# Patient Record
Sex: FEMALE | Race: WHITE | NOT HISPANIC OR LATINO | URBAN - METROPOLITAN AREA
[De-identification: names, ages, dates, MRNs, and addresses within clinical notes are randomized per-mention and may not be internally consistent; named-entity substitution may affect disease eponyms.]

---

## 2020-08-27 ENCOUNTER — OFFICE VISIT (OUTPATIENT)
Dept: URBAN - METROPOLITAN AREA TELEHEALTH 2 | Facility: TELEHEALTH | Age: 70
End: 2020-08-27
Payer: MEDICARE

## 2020-08-27 ENCOUNTER — LAB OUTSIDE AN ENCOUNTER (OUTPATIENT)
Dept: URBAN - METROPOLITAN AREA TELEHEALTH 2 | Facility: TELEHEALTH | Age: 70
End: 2020-08-27

## 2020-08-27 DIAGNOSIS — R10.12 LEFT UPPER QUADRANT ABDOMINAL PAIN: ICD-10-CM

## 2020-08-27 DIAGNOSIS — Z86.010 PERSONAL HISTORY OF COLONIC POLYPS: ICD-10-CM

## 2020-08-27 DIAGNOSIS — K59.01 CONSTIPATION BY DELAYED COLONIC TRANSIT: ICD-10-CM

## 2020-08-27 PROCEDURE — G8427 DOCREV CUR MEDS BY ELIG CLIN: HCPCS | Performed by: NURSE PRACTITIONER

## 2020-08-27 PROCEDURE — G8417 CALC BMI ABV UP PARAM F/U: HCPCS | Performed by: NURSE PRACTITIONER

## 2020-08-27 PROCEDURE — G9903 PT SCRN TBCO ID AS NON USER: HCPCS | Performed by: NURSE PRACTITIONER

## 2020-08-27 PROCEDURE — 99213 OFFICE O/P EST LOW 20 MIN: CPT | Performed by: NURSE PRACTITIONER

## 2020-08-27 PROCEDURE — 1036F TOBACCO NON-USER: CPT | Performed by: NURSE PRACTITIONER

## 2020-08-27 PROCEDURE — 3017F COLORECTAL CA SCREEN DOC REV: CPT | Performed by: NURSE PRACTITIONER

## 2020-08-27 RX ORDER — TRAMADOL HYDROCHLORIDE 50 MG/1
TABLET ORAL
Qty: 0 | Refills: 0 | Status: ACTIVE | COMMUNITY
Start: 1900-01-01

## 2020-08-27 RX ORDER — MELOXICAM 15 MG/1
TAKE 0.5 TABLET BY ORAL ROUTE DAILY AS NEEDED TABLET ORAL 1
Qty: 0 | Refills: 0 | Status: ACTIVE | COMMUNITY
Start: 1900-01-01

## 2020-08-27 RX ORDER — ASPIRIN 325 MG/1
TABLET ORAL
Qty: 0 | Refills: 0 | Status: ACTIVE | COMMUNITY
Start: 1900-01-01

## 2020-08-27 RX ORDER — LISINOPRIL 20 MG/1
TAKE 1 TABLET (20 MG) BY ORAL ROUTE ONCE DAILY TABLET ORAL 1
Qty: 0 | Refills: 0 | Status: ACTIVE | COMMUNITY
Start: 1900-01-01

## 2020-08-27 RX ORDER — CHLORTHALIDONE 25 MG/1
TAKE 1 TABLET (25 MG) BY ORAL ROUTE ONCE DAILY TABLET ORAL 1
Qty: 0 | Refills: 0 | Status: ACTIVE | COMMUNITY
Start: 1900-01-01

## 2020-08-27 RX ORDER — HYOSCYAMINE SULFATE 0.12 MG/1
PLACE 1 TABLET UNDER TONGUE BY TRANSLINGUAL ROUTE EVERY 4 TO 6 HOURS AS NEEDED  SPASM/ABDOMINAL PAIN TABLET, ORALLY DISINTEGRATING ORAL
Qty: 60 | Refills: 3 | Status: ACTIVE | COMMUNITY
Start: 2019-10-22

## 2020-08-27 RX ORDER — ESOMEPRAZOLE MAGNESIUM 20 MG/1
TAKE 1 CAPSULE (20 MG) BY ORAL ROUTE ONCE DAILY AT LEAST 1 HOUR BEFORE A MEAL SWALLOWING WHOLE. DO NOT CRUSH OR CHEW GRANULES CAPSULE, DELAYED RELEASE ORAL 1
Qty: 0 | Refills: 0 | Status: ACTIVE | COMMUNITY
Start: 1900-01-01

## 2020-08-27 RX ORDER — TRIAMTERENE AND HYDROCHLOROTHIAZIDE 37.5; 25 MG/1; MG/1
TAKE 1 TABLET BY ORAL ROUTE ONCE DAILY TABLET ORAL 1
Qty: 0 | Refills: 0 | Status: ACTIVE | COMMUNITY
Start: 1900-01-01

## 2020-08-27 RX ORDER — AMITRIPTYLINE HYDROCHLORIDE 25 MG/1
TAKE 1 TABLET (25 MG) BY ORAL ROUTE ONCE DAILY AT BEDTIME FOR 90 DAYS TABLET, FILM COATED ORAL 1
Qty: 90 | Refills: 3 | Status: ACTIVE | COMMUNITY
Start: 2017-10-13

## 2020-08-27 NOTE — HPI-OTHER HISTORIES
Mrs. Carole Garcia is a 69-year-old female who presents via telephone/telehealth visit today for abdominal pain.  In the past she has followed with Dr. Polanco and Jazmyn.  She has history of diverticulitis.  Until 3 weeks ago, she was in her usual state of health and feeling well.  About that time she started to notice some abdominal bloating and distention with change in her bowel habits including smaller stools and less frequent stools.  She has been on Colace at bedtime for quite a while which has kept her regular.  She also takes Metamucil.  About a week ago she began to have various twinges of abdominal pain in the right lower quadrant and left upper quadrant.  She is concerned she may be developing diverticulitis.  No fever or chills.  The right lower quadrant pain resolved however she continues with the left mid to upper discomfort.  No tenderness when she pushes on this area. She has a CTA abdomen scheduled for Monday with her vascular surgeon.  She last had colonoscopy 1/2020 with a hyperplastic polyp in the ascending colon and diverticulosis of the sigmoid and descending colon-recommended to repeat in 5 years for personal history of polyps.  TG

## 2020-08-28 ENCOUNTER — TELEPHONE ENCOUNTER (OUTPATIENT)
Dept: URBAN - METROPOLITAN AREA CLINIC 92 | Facility: CLINIC | Age: 70
End: 2020-08-28

## 2020-08-28 RX ORDER — METRONIDAZOLE 500 MG/1
1 TABLET TABLET, FILM COATED ORAL THREE TIMES A DAY
Qty: 30 TABLETS | Refills: 0 | OUTPATIENT
Start: 2020-08-28 | End: 2020-09-07

## 2020-08-28 RX ORDER — LISINOPRIL 20 MG/1
TAKE 1 TABLET (20 MG) BY ORAL ROUTE ONCE DAILY TABLET ORAL 1
Qty: 0 | Refills: 0 | Status: ACTIVE | COMMUNITY
Start: 1900-01-01

## 2020-08-28 RX ORDER — TRIAMTERENE AND HYDROCHLOROTHIAZIDE 37.5; 25 MG/1; MG/1
TAKE 1 TABLET BY ORAL ROUTE ONCE DAILY TABLET ORAL 1
Qty: 0 | Refills: 0 | Status: ACTIVE | COMMUNITY
Start: 1900-01-01

## 2020-08-28 RX ORDER — AMITRIPTYLINE HYDROCHLORIDE 25 MG/1
TAKE 1 TABLET (25 MG) BY ORAL ROUTE ONCE DAILY AT BEDTIME FOR 90 DAYS TABLET, FILM COATED ORAL 1
Qty: 90 | Refills: 3 | Status: ACTIVE | COMMUNITY
Start: 2017-10-13

## 2020-08-28 RX ORDER — MELOXICAM 15 MG/1
TAKE 0.5 TABLET BY ORAL ROUTE DAILY AS NEEDED TABLET ORAL 1
Qty: 0 | Refills: 0 | Status: ACTIVE | COMMUNITY
Start: 1900-01-01

## 2020-08-28 RX ORDER — CHLORTHALIDONE 25 MG/1
TAKE 1 TABLET (25 MG) BY ORAL ROUTE ONCE DAILY TABLET ORAL 1
Qty: 0 | Refills: 0 | Status: ACTIVE | COMMUNITY
Start: 1900-01-01

## 2020-08-28 RX ORDER — HYOSCYAMINE SULFATE 0.12 MG/1
PLACE 1 TABLET UNDER TONGUE BY TRANSLINGUAL ROUTE EVERY 4 TO 6 HOURS AS NEEDED  SPASM/ABDOMINAL PAIN TABLET, ORALLY DISINTEGRATING ORAL
Qty: 60 | Refills: 3 | Status: ACTIVE | COMMUNITY
Start: 2019-10-22

## 2020-08-28 RX ORDER — ESOMEPRAZOLE MAGNESIUM 20 MG/1
TAKE 1 CAPSULE (20 MG) BY ORAL ROUTE ONCE DAILY AT LEAST 1 HOUR BEFORE A MEAL SWALLOWING WHOLE. DO NOT CRUSH OR CHEW GRANULES CAPSULE, DELAYED RELEASE ORAL 1
Qty: 0 | Refills: 0 | Status: ACTIVE | COMMUNITY
Start: 1900-01-01

## 2020-08-28 RX ORDER — CIPROFLOXACIN HCL 500 MG
1 TABLET TABLET ORAL
Qty: 20 TABLETS | Refills: 0 | OUTPATIENT
Start: 2020-08-28 | End: 2020-09-07

## 2020-08-28 RX ORDER — ASPIRIN 325 MG/1
TABLET ORAL
Qty: 0 | Refills: 0 | Status: ACTIVE | COMMUNITY
Start: 1900-01-01

## 2020-08-28 RX ORDER — TRAMADOL HYDROCHLORIDE 50 MG/1
TABLET ORAL
Qty: 0 | Refills: 0 | Status: ACTIVE | COMMUNITY
Start: 1900-01-01

## 2020-09-10 ENCOUNTER — OFFICE VISIT (OUTPATIENT)
Dept: URBAN - METROPOLITAN AREA TELEHEALTH 2 | Facility: TELEHEALTH | Age: 70
End: 2020-09-10
Payer: MEDICARE

## 2020-09-10 DIAGNOSIS — K59.00 CONSTIPATION: ICD-10-CM

## 2020-09-10 DIAGNOSIS — R10.9 ABDOMINAL PAIN: ICD-10-CM

## 2020-09-10 DIAGNOSIS — Z86.010 PERSONAL HISTORY OF COLONIC POLYPS: ICD-10-CM

## 2020-09-10 DIAGNOSIS — K57.90 DIVERTICULOSIS: ICD-10-CM

## 2020-09-10 PROCEDURE — 99213 OFFICE O/P EST LOW 20 MIN: CPT | Performed by: NURSE PRACTITIONER

## 2020-09-10 PROCEDURE — G9903 PT SCRN TBCO ID AS NON USER: HCPCS | Performed by: NURSE PRACTITIONER

## 2020-09-10 PROCEDURE — 99442 PHONE E/M BY PHYS 11-20 MIN: CPT | Performed by: NURSE PRACTITIONER

## 2020-09-10 PROCEDURE — G8427 DOCREV CUR MEDS BY ELIG CLIN: HCPCS | Performed by: NURSE PRACTITIONER

## 2020-09-10 PROCEDURE — 1036F TOBACCO NON-USER: CPT | Performed by: NURSE PRACTITIONER

## 2020-09-10 PROCEDURE — 3017F COLORECTAL CA SCREEN DOC REV: CPT | Performed by: NURSE PRACTITIONER

## 2020-09-10 RX ORDER — AMITRIPTYLINE HYDROCHLORIDE 25 MG/1
TAKE 1 TABLET (25 MG) BY ORAL ROUTE ONCE DAILY AT BEDTIME FOR 90 DAYS TABLET, FILM COATED ORAL 1
Qty: 90 | Refills: 3 | Status: ACTIVE | COMMUNITY
Start: 2017-10-13

## 2020-09-10 RX ORDER — ASPIRIN 325 MG/1
TABLET ORAL
Qty: 0 | Refills: 0 | Status: ACTIVE | COMMUNITY
Start: 1900-01-01

## 2020-09-10 RX ORDER — HYOSCYAMINE SULFATE 0.12 MG/1
PLACE 1 TABLET UNDER TONGUE BY TRANSLINGUAL ROUTE EVERY 4 TO 6 HOURS AS NEEDED  SPASM/ABDOMINAL PAIN TABLET, ORALLY DISINTEGRATING ORAL
Qty: 60 | Refills: 3 | Status: ACTIVE | COMMUNITY
Start: 2019-10-22

## 2020-09-10 RX ORDER — ESOMEPRAZOLE MAGNESIUM 20 MG/1
TAKE 1 CAPSULE (20 MG) BY ORAL ROUTE ONCE DAILY AT LEAST 1 HOUR BEFORE A MEAL SWALLOWING WHOLE. DO NOT CRUSH OR CHEW GRANULES CAPSULE, DELAYED RELEASE ORAL 1
Qty: 0 | Refills: 0 | Status: ACTIVE | COMMUNITY
Start: 1900-01-01

## 2020-09-10 RX ORDER — MELOXICAM 15 MG/1
TAKE 0.5 TABLET BY ORAL ROUTE DAILY AS NEEDED TABLET ORAL 1
Qty: 0 | Refills: 0 | Status: ACTIVE | COMMUNITY
Start: 1900-01-01

## 2020-09-10 RX ORDER — TRIAMTERENE AND HYDROCHLOROTHIAZIDE 37.5; 25 MG/1; MG/1
TAKE 1 TABLET BY ORAL ROUTE ONCE DAILY TABLET ORAL 1
Qty: 0 | Refills: 0 | Status: ACTIVE | COMMUNITY
Start: 1900-01-01

## 2020-09-10 RX ORDER — LISINOPRIL 20 MG/1
TAKE 1 TABLET (20 MG) BY ORAL ROUTE ONCE DAILY TABLET ORAL 1
Qty: 0 | Refills: 0 | Status: ACTIVE | COMMUNITY
Start: 1900-01-01

## 2020-09-10 RX ORDER — CHLORTHALIDONE 25 MG/1
TAKE 1 TABLET (25 MG) BY ORAL ROUTE ONCE DAILY TABLET ORAL 1
Qty: 0 | Refills: 0 | Status: ACTIVE | COMMUNITY
Start: 1900-01-01

## 2020-09-10 RX ORDER — TRAMADOL HYDROCHLORIDE 50 MG/1
TABLET ORAL
Qty: 0 | Refills: 0 | Status: ACTIVE | COMMUNITY
Start: 1900-01-01

## 2020-09-10 NOTE — HPI-TODAY'S VISIT:
Mrs. Carole Garcia is a 69-year-old female who presents via telephone/telehealth visit today for abdominal pain.  In the past she has followed with Dr. Polanco and Jazmyn.  She has history of diverticulitis.  Until 3 weeks ago, she was in her usual state of health and feeling well.  About that time she started to notice some abdominal bloating and distention with change in her bowel habits including smaller stools and less frequent stools.  She has been on Colace at bedtime for quite a while which has kept her regular.  She also takes Metamucil.  About a week ago she began to have various twinges of abdominal pain in the right lower quadrant and left upper quadrant.  She is concerned she may be developing diverticulitis.  No fever or chills.  The right lower quadrant pain resolved however she continues with the left mid to upper discomfort.  No tenderness when she pushes on this area. She has a CTA abdomen scheduled for Monday with her vascular surgeon.  She last had colonoscopy 1/2020 with a hyperplastic polyp in the ascending colon and diverticulosis of the sigmoid and descending colon-recommended to repeat in 5 years for personal history of polyps.  TG  9/10/2020 Ms. Garcia presents for follow up of diverticulitis. Since her last visit she is s/p flagyl (she is allergic to fluroquinlones). The pain has improved and yesterday she had a normal BM. At this point she is off AMT, she is on colace and mag citrate as needed. She is trying to take metamucil but can't tolerate the powder. She is taking her nexium 20mg daily. Her chronic abdominal pain has improved somewhat. Today she is doing better. MB

## 2020-09-30 ENCOUNTER — OFFICE VISIT (OUTPATIENT)
Dept: URBAN - NONMETROPOLITAN AREA CLINIC 2 | Facility: CLINIC | Age: 70
End: 2020-09-30
Payer: MEDICARE

## 2020-09-30 DIAGNOSIS — R10.9 ABDOMINAL PAIN: ICD-10-CM

## 2020-09-30 DIAGNOSIS — K57.90 DIVERTICULOSIS: ICD-10-CM

## 2020-09-30 DIAGNOSIS — K21.9 GERD: ICD-10-CM

## 2020-09-30 DIAGNOSIS — Z86.010 PERSONAL HISTORY OF COLONIC POLYPS: ICD-10-CM

## 2020-09-30 DIAGNOSIS — K59.00 CONSTIPATION: ICD-10-CM

## 2020-09-30 PROCEDURE — 3017F COLORECTAL CA SCREEN DOC REV: CPT | Performed by: NURSE PRACTITIONER

## 2020-09-30 PROCEDURE — G8427 DOCREV CUR MEDS BY ELIG CLIN: HCPCS | Performed by: NURSE PRACTITIONER

## 2020-09-30 PROCEDURE — 99214 OFFICE O/P EST MOD 30 MIN: CPT | Performed by: NURSE PRACTITIONER

## 2020-09-30 PROCEDURE — G9903 PT SCRN TBCO ID AS NON USER: HCPCS | Performed by: NURSE PRACTITIONER

## 2020-09-30 RX ORDER — SULFAMETHOXAZOLE AND TRIMETHOPRIM 800; 160 MG/1; MG/1
1 TABLET TABLET ORAL TWICE A DAY
Qty: 20 | OUTPATIENT
Start: 2020-09-30 | End: 2020-10-10

## 2020-09-30 RX ORDER — LISINOPRIL 20 MG/1
TAKE 1 TABLET (20 MG) BY ORAL ROUTE ONCE DAILY TABLET ORAL 1
Qty: 0 | Refills: 0 | Status: ACTIVE | COMMUNITY
Start: 1900-01-01

## 2020-09-30 RX ORDER — ASPIRIN 325 MG/1
TABLET ORAL
Qty: 0 | Refills: 0 | Status: ACTIVE | COMMUNITY
Start: 1900-01-01

## 2020-09-30 RX ORDER — AMITRIPTYLINE HYDROCHLORIDE 25 MG/1
TAKE 1 TABLET (25 MG) BY ORAL ROUTE ONCE DAILY AT BEDTIME FOR 90 DAYS TABLET, FILM COATED ORAL 1
Qty: 90 | Refills: 3 | Status: ACTIVE | COMMUNITY
Start: 2017-10-13

## 2020-09-30 RX ORDER — TRAMADOL HYDROCHLORIDE 50 MG/1
TABLET ORAL
Qty: 0 | Refills: 0 | Status: ACTIVE | COMMUNITY
Start: 1900-01-01

## 2020-09-30 RX ORDER — MELOXICAM 15 MG/1
TAKE 0.5 TABLET BY ORAL ROUTE DAILY AS NEEDED TABLET ORAL 1
Qty: 0 | Refills: 0 | Status: ACTIVE | COMMUNITY
Start: 1900-01-01

## 2020-09-30 RX ORDER — HYOSCYAMINE SULFATE 0.12 MG/1
PLACE 1 TABLET UNDER TONGUE BY TRANSLINGUAL ROUTE EVERY 4 TO 6 HOURS AS NEEDED  SPASM/ABDOMINAL PAIN TABLET, ORALLY DISINTEGRATING ORAL
Qty: 60 | Refills: 3 | Status: ACTIVE | COMMUNITY
Start: 2019-10-22

## 2020-09-30 RX ORDER — ESOMEPRAZOLE MAGNESIUM 20 MG/1
TAKE 1 CAPSULE (20 MG) BY ORAL ROUTE ONCE DAILY AT LEAST 1 HOUR BEFORE A MEAL SWALLOWING WHOLE. DO NOT CRUSH OR CHEW GRANULES CAPSULE, DELAYED RELEASE ORAL 1
Qty: 0 | Refills: 0 | Status: ACTIVE | COMMUNITY
Start: 1900-01-01

## 2020-09-30 RX ORDER — TRIAMTERENE AND HYDROCHLOROTHIAZIDE 37.5; 25 MG/1; MG/1
TAKE 1 TABLET BY ORAL ROUTE ONCE DAILY TABLET ORAL 1
Qty: 0 | Refills: 0 | Status: ACTIVE | COMMUNITY
Start: 1900-01-01

## 2020-09-30 RX ORDER — METRONIDAZOLE 500 MG/1
1 TABLET TABLET, FILM COATED ORAL THREE TIMES A DAY
Qty: 42 TABLET | Refills: 0 | OUTPATIENT
Start: 2020-09-30 | End: 2020-10-14

## 2020-09-30 RX ORDER — CHLORTHALIDONE 25 MG/1
TAKE 1 TABLET (25 MG) BY ORAL ROUTE ONCE DAILY TABLET ORAL 1
Qty: 0 | Refills: 0 | Status: ACTIVE | COMMUNITY
Start: 1900-01-01

## 2020-09-30 NOTE — HPI-TODAY'S VISIT:
8/20 Mrs. Carole Garcia is a 69-year-old female who presents via telephone/telehealth visit today for abdominal pain.  In the past she has followed with Dr. Polanco and Jazmyn.  She has history of diverticulitis.  Until 3 weeks ago, she was in her usual state of health and feeling well.  About that time she started to notice some abdominal bloating and distention with change in her bowel habits including smaller stools and less frequent stools.  She has been on Colace at bedtime for quite a while which has kept her regular.  She also takes Metamucil.  About a week ago she began to have various twinges of abdominal pain in the right lower quadrant and left upper quadrant.  She is concerned she may be developing diverticulitis.  No fever or chills.  The right lower quadrant pain resolved however she continues with the left mid to upper discomfort.  No tenderness when she pushes on this area. She has a CTA abdomen scheduled for Monday with her vascular surgeon.  She last had colonoscopy 1/2020 with a hyperplastic polyp in the ascending colon and diverticulosis of the sigmoid and descending colon-recommended to repeat in 5 years for personal history of polyps.  TG  9/10/2020 Ms. Garcia presents for follow up of diverticulitis. Since her last visit she is s/p flagyl (she is allergic to fluroquinlones). The pain has improved and yesterday she had a normal BM. At this point she is off AMT, she is on colace and mag citrate as needed. She is trying to take metamucil but can't tolerate the powder. She is taking her nexium 20mg daily. Her chronic abdominal pain has improved somewhat. Today she is doing better. MB  9/30/20  Ms Garcia returns to discuss possible diverticulitis flare. she reports worsening constipation in recent days despite metamucil and colace. She started having some lower midline pain and low grade temp. She has felt better in the last 24 hours, but still doesn't feel well. Her son's father is terminally ill and she will be having to go to [place] to see him. no additional complaints. SB

## 2020-10-01 LAB
A/G RATIO: 1.5
ALBUMIN: 4.5
ALKALINE PHOSPHATASE: 96
AST (SGOT): 23
BASO (ABSOLUTE): 0
BASOS: 1
BILIRUBIN, TOTAL: 0.4
BUN/CREATININE RATIO: 12
BUN: 11
CALCIUM: 10
CARBON DIOXIDE, TOTAL: 24
CHLORIDE: 98
CREATININE: 0.89
EGFR IF AFRICN AM: 78
EGFR IF NONAFRICN AM: 67
EOS (ABSOLUTE): 0.2
EOS: 3
GLOBULIN, TOTAL: 3
GLUCOSE: 102
HEMATOCRIT: 44.2
HEMATOLOGY COMMENTS:: (no result)
HEMOGLOBIN: 14.4
IMMATURE CELLS: (no result)
IMMATURE GRANS (ABS): 0
IMMATURE GRANULOCYTES: 0
LYMPHS (ABSOLUTE): 2.2
LYMPHS: 26
MCH: 29.1
MCHC: 32.6
MCV: 89
MONOCYTES(ABSOLUTE): 0.7
MONOCYTES: 8
NEUTROPHILS (ABSOLUTE): 5.3
NEUTROPHILS: 62
NRBC: (no result)
PLATELETS: 420
POTASSIUM: 3.7
PROTEIN, TOTAL: 7.5
RBC: 4.95
RDW: 13.9
SODIUM: 140
WBC: 8.5

## 2020-11-11 ENCOUNTER — OFFICE VISIT (OUTPATIENT)
Dept: URBAN - METROPOLITAN AREA TELEHEALTH 2 | Facility: TELEHEALTH | Age: 70
End: 2020-11-11
Payer: MEDICARE

## 2020-11-11 DIAGNOSIS — Z86.010 PERSONAL HISTORY OF COLONIC POLYPS: ICD-10-CM

## 2020-11-11 DIAGNOSIS — K21.9 GERD: ICD-10-CM

## 2020-11-11 DIAGNOSIS — K57.30 COLON, DIVERTICULOSIS: ICD-10-CM

## 2020-11-11 DIAGNOSIS — R10.84 ABDOMINAL PAIN, GENERALIZED: ICD-10-CM

## 2020-11-11 DIAGNOSIS — K59.09 OTHER CONSTIPATION: ICD-10-CM

## 2020-11-11 PROCEDURE — G9903 PT SCRN TBCO ID AS NON USER: HCPCS | Performed by: INTERNAL MEDICINE

## 2020-11-11 PROCEDURE — 1036F TOBACCO NON-USER: CPT | Performed by: INTERNAL MEDICINE

## 2020-11-11 PROCEDURE — 3017F COLORECTAL CA SCREEN DOC REV: CPT | Performed by: INTERNAL MEDICINE

## 2020-11-11 PROCEDURE — 99213 OFFICE O/P EST LOW 20 MIN: CPT | Performed by: INTERNAL MEDICINE

## 2020-11-11 PROCEDURE — G8427 DOCREV CUR MEDS BY ELIG CLIN: HCPCS | Performed by: INTERNAL MEDICINE

## 2020-11-11 RX ORDER — ESOMEPRAZOLE MAGNESIUM 20 MG/1
TAKE 1 CAPSULE (20 MG) BY ORAL ROUTE ONCE DAILY AT LEAST 1 HOUR BEFORE A MEAL SWALLOWING WHOLE. DO NOT CRUSH OR CHEW GRANULES CAPSULE, DELAYED RELEASE ORAL 1
Qty: 0 | Refills: 0 | Status: ACTIVE | COMMUNITY
Start: 1900-01-01

## 2020-11-11 RX ORDER — ASPIRIN 325 MG/1
TABLET ORAL
Qty: 0 | Refills: 0 | Status: ACTIVE | COMMUNITY
Start: 1900-01-01

## 2020-11-11 RX ORDER — TRAMADOL HYDROCHLORIDE 50 MG/1
TABLET ORAL
Qty: 0 | Refills: 0 | Status: ACTIVE | COMMUNITY
Start: 1900-01-01

## 2020-11-11 RX ORDER — MELOXICAM 15 MG/1
TAKE 0.5 TABLET BY ORAL ROUTE DAILY AS NEEDED TABLET ORAL 1
Qty: 0 | Refills: 0 | Status: ACTIVE | COMMUNITY
Start: 1900-01-01

## 2020-11-11 RX ORDER — TRIAMTERENE AND HYDROCHLOROTHIAZIDE 37.5; 25 MG/1; MG/1
TAKE 1 TABLET BY ORAL ROUTE ONCE DAILY TABLET ORAL 1
Qty: 0 | Refills: 0 | Status: ACTIVE | COMMUNITY
Start: 1900-01-01

## 2020-11-11 RX ORDER — CHLORTHALIDONE 25 MG/1
TAKE 1 TABLET (25 MG) BY ORAL ROUTE ONCE DAILY TABLET ORAL 1
Qty: 0 | Refills: 0 | Status: ACTIVE | COMMUNITY
Start: 1900-01-01

## 2020-11-11 RX ORDER — LISINOPRIL 20 MG/1
TAKE 1 TABLET (20 MG) BY ORAL ROUTE ONCE DAILY TABLET ORAL 1
Qty: 0 | Refills: 0 | Status: ACTIVE | COMMUNITY
Start: 1900-01-01

## 2020-11-11 RX ORDER — AMITRIPTYLINE HYDROCHLORIDE 25 MG/1
TAKE 1 TABLET (25 MG) BY ORAL ROUTE ONCE DAILY AT BEDTIME FOR 90 DAYS TABLET, FILM COATED ORAL 1
Qty: 90 | Refills: 3 | Status: ACTIVE | COMMUNITY
Start: 2017-10-13

## 2020-11-11 RX ORDER — HYOSCYAMINE SULFATE 0.12 MG/1
PLACE 1 TABLET UNDER TONGUE BY TRANSLINGUAL ROUTE EVERY 4 TO 6 HOURS AS NEEDED  SPASM/ABDOMINAL PAIN TABLET, ORALLY DISINTEGRATING ORAL
Qty: 60 | Refills: 3 | Status: ACTIVE | COMMUNITY
Start: 2019-10-22

## 2020-11-11 NOTE — HPI-TODAY'S VISIT:
8/20 Mrs. Carole Garcia is a 69-year-old female who presents via telephone/telehealth visit today for abdominal pain.  In the past she has followed with Dr. Polanco and Jazmyn.  She has history of diverticulitis.  Until 3 weeks ago, she was in her usual state of health and feeling well.  About that time she started to notice some abdominal bloating and distention with change in her bowel habits including smaller stools and less frequent stools.  She has been on Colace at bedtime for quite a while which has kept her regular.  She also takes Metamucil.  About a week ago she began to have various twinges of abdominal pain in the right lower quadrant and left upper quadrant.  She is concerned she may be developing diverticulitis.  No fever or chills.  The right lower quadrant pain resolved however she continues with the left mid to upper discomfort.  No tenderness when she pushes on this area. She has a CTA abdomen scheduled for Monday with her vascular surgeon.  She last had colonoscopy 1/2020 with a hyperplastic polyp in the ascending colon and diverticulosis of the sigmoid and descending colon-recommended to repeat in 5 years for personal history of polyps.  TG  9/10/2020 Ms. Garcia presents for follow up of diverticulitis. Since her last visit she is s/p flagyl (she is allergic to fluroquinlones). The pain has improved and yesterday she had a normal BM. At this point she is off AMT, she is on colace and mag citrate as needed. She is trying to take metamucil but can't tolerate the powder. She is taking her nexium 20mg daily. Her chronic abdominal pain has improved somewhat. Today she is doing better. MB  9/30/20  Ms Garcia returns to discuss possible diverticulitis flare. she reports worsening constipation in recent days despite metamucil and colace. She started having some lower midline pain and low grade temp. She has felt better in the last 24 hours, but still doesn't feel well. Her son's father is terminally ill and she will be having to go to [place] to see him. no additional complaints. SB   11/11/2020 Ms. Garcia presents for follow up of diverticulosis and GERD. Since her last visit she is doing better. She has been diagnosed with sleep apnea and placed on a CPAP. She did not take the bactrim as she is allergic to slufa drugs as well. She did take the flagyl for 14 days with relief. Her pain improved and at baseline. Her reflux is stable on nexium daily. Today she is doing well. MB

## 2021-02-22 ENCOUNTER — TELEPHONE ENCOUNTER (OUTPATIENT)
Dept: URBAN - NONMETROPOLITAN AREA CLINIC 2 | Facility: CLINIC | Age: 71
End: 2021-02-22

## 2021-02-22 RX ORDER — TRAMADOL HYDROCHLORIDE 50 MG/1
TABLET ORAL
Qty: 0 | Refills: 0 | Status: ACTIVE | COMMUNITY
Start: 1900-01-01

## 2021-02-22 RX ORDER — AMITRIPTYLINE HYDROCHLORIDE 25 MG/1
TAKE 1 TABLET (25 MG) BY ORAL ROUTE ONCE DAILY AT BEDTIME FOR 90 DAYS TABLET, FILM COATED ORAL 1
Qty: 90 | Refills: 3 | Status: ACTIVE | COMMUNITY
Start: 2017-10-13

## 2021-02-22 RX ORDER — HYOSCYAMINE SULFATE 0.12 MG/1
PLACE 1 TABLET UNDER TONGUE BY TRANSLINGUAL ROUTE EVERY 4 TO 6 HOURS AS NEEDED  SPASM/ABDOMINAL PAIN TABLET, ORALLY DISINTEGRATING ORAL
Qty: 60 | Refills: 3 | Status: ACTIVE | COMMUNITY
Start: 2019-10-22

## 2021-02-22 RX ORDER — CHLORTHALIDONE 25 MG/1
TAKE 1 TABLET (25 MG) BY ORAL ROUTE ONCE DAILY TABLET ORAL 1
Qty: 0 | Refills: 0 | Status: ACTIVE | COMMUNITY
Start: 1900-01-01

## 2021-02-22 RX ORDER — TRIAMTERENE AND HYDROCHLOROTHIAZIDE 37.5; 25 MG/1; MG/1
TAKE 1 TABLET BY ORAL ROUTE ONCE DAILY TABLET ORAL 1
Qty: 0 | Refills: 0 | Status: ACTIVE | COMMUNITY
Start: 1900-01-01

## 2021-02-22 RX ORDER — METRONIDAZOLE 500 MG/1
1 TABLET TABLET, FILM COATED ORAL THREE TIMES A DAY
Qty: 42 TABLET | Refills: 0 | OUTPATIENT
Start: 2021-02-22 | End: 2021-03-08

## 2021-02-22 RX ORDER — ASPIRIN 325 MG/1
TABLET ORAL
Qty: 0 | Refills: 0 | Status: ACTIVE | COMMUNITY
Start: 1900-01-01

## 2021-02-22 RX ORDER — ESOMEPRAZOLE MAGNESIUM 20 MG/1
TAKE 1 CAPSULE (20 MG) BY ORAL ROUTE ONCE DAILY AT LEAST 1 HOUR BEFORE A MEAL SWALLOWING WHOLE. DO NOT CRUSH OR CHEW GRANULES CAPSULE, DELAYED RELEASE ORAL 1
Qty: 0 | Refills: 0 | Status: ACTIVE | COMMUNITY
Start: 1900-01-01

## 2021-02-22 RX ORDER — MELOXICAM 15 MG/1
TAKE 0.5 TABLET BY ORAL ROUTE DAILY AS NEEDED TABLET ORAL 1
Qty: 0 | Refills: 0 | Status: ACTIVE | COMMUNITY
Start: 1900-01-01

## 2021-02-22 RX ORDER — LISINOPRIL 20 MG/1
TAKE 1 TABLET (20 MG) BY ORAL ROUTE ONCE DAILY TABLET ORAL 1
Qty: 0 | Refills: 0 | Status: ACTIVE | COMMUNITY
Start: 1900-01-01

## 2021-03-08 ENCOUNTER — TELEPHONE ENCOUNTER (OUTPATIENT)
Dept: URBAN - METROPOLITAN AREA CLINIC 92 | Facility: CLINIC | Age: 71
End: 2021-03-08

## 2021-03-09 ENCOUNTER — OFFICE VISIT (OUTPATIENT)
Dept: URBAN - NONMETROPOLITAN AREA CLINIC 2 | Facility: CLINIC | Age: 71
End: 2021-03-09
Payer: MEDICARE

## 2021-03-09 VITALS
SYSTOLIC BLOOD PRESSURE: 108 MMHG | BODY MASS INDEX: 34.53 KG/M2 | WEIGHT: 220 LBS | HEIGHT: 67 IN | HEART RATE: 94 BPM | DIASTOLIC BLOOD PRESSURE: 72 MMHG | TEMPERATURE: 97.8 F

## 2021-03-09 DIAGNOSIS — K57.90 DIVERTICULOSIS: ICD-10-CM

## 2021-03-09 DIAGNOSIS — K21.9 GERD: ICD-10-CM

## 2021-03-09 DIAGNOSIS — Z86.010 PERSONAL HISTORY OF COLONIC POLYPS: ICD-10-CM

## 2021-03-09 DIAGNOSIS — K57.92 DIVERTICULITIS: ICD-10-CM

## 2021-03-09 DIAGNOSIS — K59.00 CONSTIPATION: ICD-10-CM

## 2021-03-09 PROCEDURE — 99214 OFFICE O/P EST MOD 30 MIN: CPT | Performed by: NURSE PRACTITIONER

## 2021-03-09 RX ORDER — ASPIRIN 325 MG/1
TABLET ORAL
Qty: 0 | Refills: 0 | Status: ON HOLD | COMMUNITY
Start: 1900-01-01

## 2021-03-09 RX ORDER — CHLORTHALIDONE 25 MG/1
TAKE 1 TABLET (25 MG) BY ORAL ROUTE ONCE DAILY TABLET ORAL 1
Qty: 0 | Refills: 0 | Status: ACTIVE | COMMUNITY
Start: 1900-01-01

## 2021-03-09 RX ORDER — TRIAMTERENE AND HYDROCHLOROTHIAZIDE 37.5; 25 MG/1; MG/1
TAKE 1 TABLET BY ORAL ROUTE ONCE DAILY TABLET ORAL 1
Qty: 0 | Refills: 0 | Status: ON HOLD | COMMUNITY
Start: 1900-01-01

## 2021-03-09 RX ORDER — MELOXICAM 15 MG/1
TAKE 0.5 TABLET BY ORAL ROUTE DAILY AS NEEDED TABLET ORAL 1
Qty: 0 | Refills: 0 | Status: ON HOLD | COMMUNITY
Start: 1900-01-01

## 2021-03-09 RX ORDER — TRAMADOL HYDROCHLORIDE 50 MG/1
TABLET ORAL
Qty: 0 | Refills: 0 | Status: ON HOLD | COMMUNITY
Start: 1900-01-01

## 2021-03-09 RX ORDER — AMITRIPTYLINE HYDROCHLORIDE 25 MG/1
TAKE 1 TABLET (25 MG) BY ORAL ROUTE ONCE DAILY AT BEDTIME FOR 90 DAYS TABLET, FILM COATED ORAL 1
Qty: 90 | Refills: 3 | Status: ON HOLD | COMMUNITY
Start: 2017-10-13

## 2021-03-09 RX ORDER — HYOSCYAMINE SULFATE 0.12 MG/1
PLACE 1 TABLET UNDER TONGUE BY TRANSLINGUAL ROUTE EVERY 4 TO 6 HOURS AS NEEDED  SPASM/ABDOMINAL PAIN TABLET, ORALLY DISINTEGRATING ORAL
Qty: 60 | Refills: 3 | Status: ON HOLD | COMMUNITY
Start: 2019-10-22

## 2021-03-09 RX ORDER — ESOMEPRAZOLE MAGNESIUM 20 MG/1
TAKE 1 CAPSULE (20 MG) BY ORAL ROUTE ONCE DAILY AT LEAST 1 HOUR BEFORE A MEAL SWALLOWING WHOLE. DO NOT CRUSH OR CHEW GRANULES CAPSULE, DELAYED RELEASE ORAL 1
Qty: 0 | Refills: 0 | Status: ACTIVE | COMMUNITY
Start: 1900-01-01

## 2021-03-09 RX ORDER — LISINOPRIL 20 MG/1
TAKE 1 TABLET (20 MG) BY ORAL ROUTE ONCE DAILY TABLET ORAL 1
Qty: 0 | Refills: 0 | Status: ON HOLD | COMMUNITY
Start: 1900-01-01

## 2021-03-09 RX ORDER — PRUCALOPRIDE 2 MG/1
1 TABLET TABLET, FILM COATED ORAL ONCE A DAY
Qty: 90 TABLET | Refills: 3 | OUTPATIENT
Start: 2021-03-09 | End: 2022-03-04

## 2021-03-09 NOTE — HPI-TODAY'S VISIT:
8/20 Mrs. Carole Garcia is a 69-year-old female who presents via telephone/telehealth visit today for abdominal pain.  In the past she has followed with Dr. Polanco and Jazymn.  She has history of diverticulitis.  Until 3 weeks ago, she was in her usual state of health and feeling well.  About that time she started to notice some abdominal bloating and distention with change in her bowel habits including smaller stools and less frequent stools.  She has been on Colace at bedtime for quite a while which has kept her regular.  She also takes Metamucil.  About a week ago she began to have various twinges of abdominal pain in the right lower quadrant and left upper quadrant.  She is concerned she may be developing diverticulitis.  No fever or chills.  The right lower quadrant pain resolved however she continues with the left mid to upper discomfort.  No tenderness when she pushes on this area. She has a CTA abdomen scheduled for Monday with her vascular surgeon.  She last had colonoscopy 1/2020 with a hyperplastic polyp in the ascending colon and diverticulosis of the sigmoid and descending colon-recommended to repeat in 5 years for personal history of polyps.  TG  9/10/2020 Ms. Garcia presents for follow up of diverticulitis. Since her last visit she is s/p flagyl (she is allergic to fluroquinlones). The pain has improved and yesterday she had a normal BM. At this point she is off AMT, she is on colace and mag citrate as needed. She is trying to take metamucil but can't tolerate the powder. She is taking her nexium 20mg daily. Her chronic abdominal pain has improved somewhat. Today she is doing better. MB  9/30/20  Ms Garcia returns to discuss possible diverticulitis flare. she reports worsening constipation in recent days despite metamucil and colace. She started having some lower midline pain and low grade temp. She has felt better in the last 24 hours, but still doesn't feel well. Her son's father is terminally ill and she will be having to go to [place] to see him. no additional complaints. SB   11/11/2020 Ms. Garcia presents for follow up of diverticulosis and GERD. Since her last visit she is doing better. She has been diagnosed with sleep apnea and placed on a CPAP. She did not take the bactrim as she is allergic to slufa drugs as well. She did take the flagyl for 14 days with relief. Her pain improved and at baseline. Her reflux is stable on nexium daily. Today she is doing well. MB  3/9/2021 Carole presents for follow-up of reflux, diverticulosis, history of diverticulitis, and constipation.  Since her last visit she called several weeks ago with worsening left lower quadrant abdominal pain.  She has a history of diverticulitis documented on CT in the sigmoid in 2019.  Her colonoscopy in January 2020 reveals diverticulosis.  She completed the Flagyl as this is the only antibiotic that she can tolerate and her pain improved but she continues to struggle with incomplete evacuation and spasm and constipation.  She is on MiraLAX daily, Colace, and milk of magnesia with no relief.  She has tried Linzess and Amitiza in the past with no relief.  She is also taken lactulose in the distant past with no relief.  Today she is frustrated with her symptoms and her constipation.  MB

## 2021-03-10 ENCOUNTER — OFFICE VISIT (OUTPATIENT)
Dept: URBAN - METROPOLITAN AREA TELEHEALTH 2 | Facility: TELEHEALTH | Age: 71
End: 2021-03-10

## 2021-03-10 LAB
A/G RATIO: 1.7
ALBUMIN: 4.5
ALKALINE PHOSPHATASE: 83
ALT (SGPT): 12
AST (SGOT): 15
BASO (ABSOLUTE): 0
BASOS: 0
BILIRUBIN, TOTAL: 0.3
BUN/CREATININE RATIO: 14
BUN: 13
C-REACTIVE PROTEIN, QUANT: 6
CALCIUM: 9.3
CARBON DIOXIDE, TOTAL: 27
CHLORIDE: 98
CREATININE: 0.92
EGFR IF AFRICN AM: 74
EGFR IF NONAFRICN AM: 64
EOS (ABSOLUTE): 0.3
EOS: 3
GLOBULIN, TOTAL: 2.6
GLUCOSE: 101
HEMATOCRIT: 42.9
HEMATOLOGY COMMENTS:: (no result)
HEMOGLOBIN: 14
IMMATURE CELLS: (no result)
IMMATURE GRANS (ABS): 0
IMMATURE GRANULOCYTES: 0
LYMPHS (ABSOLUTE): 2.7
LYMPHS: 29
MCH: 28.5
MCHC: 32.6
MCV: 87
MONOCYTES(ABSOLUTE): 0.9
MONOCYTES: 10
NEUTROPHILS (ABSOLUTE): 5.4
NEUTROPHILS: 58
NRBC: (no result)
PLATELETS: 409
POTASSIUM: 3.6
PROTEIN, TOTAL: 7.1
RBC: 4.92
RDW: 13.5
SEDIMENTATION RATE-WESTERGREN: 32
SODIUM: 140
WBC: 9.3

## 2021-03-19 ENCOUNTER — TELEPHONE ENCOUNTER (OUTPATIENT)
Dept: URBAN - METROPOLITAN AREA CLINIC 23 | Facility: CLINIC | Age: 71
End: 2021-03-19

## 2021-03-19 RX ORDER — HYOSCYAMINE SULFATE 0.12 MG/1
PLACE 1 TABLET UNDER TONGUE BY TRANSLINGUAL ROUTE EVERY 4 TO 6 HOURS AS NEEDED  SPASM/ABDOMINAL PAIN TABLET, ORALLY DISINTEGRATING ORAL THREE TIMES A DAY
Qty: 60 TABLET | Refills: 11
Start: 2019-10-22 | End: 2022-03-17

## 2021-03-19 RX ORDER — PRUCALOPRIDE 2 MG/1
1 TABLET TABLET, FILM COATED ORAL ONCE A DAY
Qty: 90 TABLET | Refills: 3
Start: 2021-03-09 | End: 2022-03-17

## 2021-03-24 ENCOUNTER — TELEPHONE ENCOUNTER (OUTPATIENT)
Dept: URBAN - METROPOLITAN AREA CLINIC 23 | Facility: CLINIC | Age: 71
End: 2021-03-24

## 2021-04-27 ENCOUNTER — OFFICE VISIT (OUTPATIENT)
Dept: URBAN - NONMETROPOLITAN AREA CLINIC 2 | Facility: CLINIC | Age: 71
End: 2021-04-27
Payer: MEDICARE

## 2021-04-27 VITALS
DIASTOLIC BLOOD PRESSURE: 79 MMHG | HEIGHT: 67 IN | SYSTOLIC BLOOD PRESSURE: 121 MMHG | BODY MASS INDEX: 34.53 KG/M2 | TEMPERATURE: 97.8 F | WEIGHT: 220 LBS | HEART RATE: 86 BPM

## 2021-04-27 DIAGNOSIS — K59.00 CONSTIPATION: ICD-10-CM

## 2021-04-27 DIAGNOSIS — K57.90 DIVERTICULOSIS: ICD-10-CM

## 2021-04-27 DIAGNOSIS — K57.92 DIVERTICULITIS: ICD-10-CM

## 2021-04-27 DIAGNOSIS — Z86.010 PERSONAL HISTORY OF COLONIC POLYPS: ICD-10-CM

## 2021-04-27 DIAGNOSIS — K21.9 GERD: ICD-10-CM

## 2021-04-27 PROCEDURE — 99214 OFFICE O/P EST MOD 30 MIN: CPT | Performed by: NURSE PRACTITIONER

## 2021-04-27 RX ORDER — TRIAMTERENE AND HYDROCHLOROTHIAZIDE 37.5; 25 MG/1; MG/1
TAKE 1 TABLET BY ORAL ROUTE ONCE DAILY TABLET ORAL 1
Qty: 0 | Refills: 0 | Status: ON HOLD | COMMUNITY
Start: 1900-01-01

## 2021-04-27 RX ORDER — ESOMEPRAZOLE MAGNESIUM 20 MG/1
TAKE 1 CAPSULE (20 MG) BY ORAL ROUTE ONCE DAILY AT LEAST 1 HOUR BEFORE A MEAL SWALLOWING WHOLE. DO NOT CRUSH OR CHEW GRANULES CAPSULE, DELAYED RELEASE ORAL 1
Qty: 0 | Refills: 0 | Status: ACTIVE | COMMUNITY
Start: 1900-01-01

## 2021-04-27 RX ORDER — ASPIRIN 325 MG/1
TABLET ORAL
Qty: 0 | Refills: 0 | Status: ON HOLD | COMMUNITY
Start: 1900-01-01

## 2021-04-27 RX ORDER — AMITRIPTYLINE HYDROCHLORIDE 25 MG/1
TAKE 1 TABLET (25 MG) BY ORAL ROUTE ONCE DAILY AT BEDTIME FOR 90 DAYS TABLET, FILM COATED ORAL 1
Qty: 90 | Refills: 3 | Status: ON HOLD | COMMUNITY
Start: 2017-10-13

## 2021-04-27 RX ORDER — PRUCALOPRIDE 2 MG/1
1 TABLET TABLET, FILM COATED ORAL ONCE A DAY
Qty: 90 TABLET | Refills: 3 | Status: ACTIVE | COMMUNITY
Start: 2021-03-09 | End: 2022-03-17

## 2021-04-27 RX ORDER — METRONIDAZOLE 500 MG/1
1 TABLET TABLET, FILM COATED ORAL THREE TIMES A DAY
Qty: 42 TABLET | Refills: 0 | OUTPATIENT
Start: 2021-04-27 | End: 2021-05-11

## 2021-04-27 RX ORDER — HYOSCYAMINE SULFATE 0.12 MG/1
PLACE 1 TABLET UNDER TONGUE BY TRANSLINGUAL ROUTE EVERY 4 TO 6 HOURS AS NEEDED  SPASM/ABDOMINAL PAIN TABLET, ORALLY DISINTEGRATING ORAL THREE TIMES A DAY
Qty: 60 TABLET | Refills: 11 | Status: ACTIVE | COMMUNITY
Start: 2019-10-22 | End: 2022-03-17

## 2021-04-27 RX ORDER — MELOXICAM 15 MG/1
TAKE 0.5 TABLET BY ORAL ROUTE DAILY AS NEEDED TABLET ORAL 1
Qty: 0 | Refills: 0 | Status: ON HOLD | COMMUNITY
Start: 1900-01-01

## 2021-04-27 RX ORDER — TRAMADOL HYDROCHLORIDE 50 MG/1
TABLET ORAL
Qty: 0 | Refills: 0 | Status: ON HOLD | COMMUNITY
Start: 1900-01-01

## 2021-04-27 RX ORDER — LISINOPRIL 20 MG/1
TAKE 1 TABLET (20 MG) BY ORAL ROUTE ONCE DAILY TABLET ORAL 1
Qty: 0 | Refills: 0 | Status: ON HOLD | COMMUNITY
Start: 1900-01-01

## 2021-04-27 RX ORDER — CHLORTHALIDONE 25 MG/1
TAKE 1 TABLET (25 MG) BY ORAL ROUTE ONCE DAILY TABLET ORAL 1
Qty: 0 | Refills: 0 | Status: ACTIVE | COMMUNITY
Start: 1900-01-01

## 2021-04-27 NOTE — HPI-TODAY'S VISIT:
8/20 Mrs. Carole Garcia is a 69-year-old female who presents via telephone/telehealth visit today for abdominal pain.  In the past she has followed with Dr. Polanco and Jazmyn.  She has history of diverticulitis.  Until 3 weeks ago, she was in her usual state of health and feeling well.  About that time she started to notice some abdominal bloating and distention with change in her bowel habits including smaller stools and less frequent stools.  She has been on Colace at bedtime for quite a while which has kept her regular.  She also takes Metamucil.  About a week ago she began to have various twinges of abdominal pain in the right lower quadrant and left upper quadrant.  She is concerned she may be developing diverticulitis.  No fever or chills.  The right lower quadrant pain resolved however she continues with the left mid to upper discomfort.  No tenderness when she pushes on this area. She has a CTA abdomen scheduled for Monday with her vascular surgeon.  She last had colonoscopy 1/2020 with a hyperplastic polyp in the ascending colon and diverticulosis of the sigmoid and descending colon-recommended to repeat in 5 years for personal history of polyps.  TG  9/10/2020 Ms. Garcia presents for follow up of diverticulitis. Since her last visit she is s/p flagyl (she is allergic to fluroquinlones). The pain has improved and yesterday she had a normal BM. At this point she is off AMT, she is on colace and mag citrate as needed. She is trying to take metamucil but can't tolerate the powder. She is taking her nexium 20mg daily. Her chronic abdominal pain has improved somewhat. Today she is doing better. MB  9/30/20  Ms Garcia returns to discuss possible diverticulitis flare. she reports worsening constipation in recent days despite metamucil and colace. She started having some lower midline pain and low grade temp. She has felt better in the last 24 hours, but still doesn't feel well. Her son's father is terminally ill and she will be having to go to [place] to see him. no additional complaints. SB   11/11/2020 Ms. Garcia presents for follow up of diverticulosis and GERD. Since her last visit she is doing better. She has been diagnosed with sleep apnea and placed on a CPAP. She did not take the bactrim as she is allergic to slufa drugs as well. She did take the flagyl for 14 days with relief. Her pain improved and at baseline. Her reflux is stable on nexium daily. Today she is doing well. MB  3/9/2021 Carole presents for follow-up of reflux, diverticulosis, history of diverticulitis, and constipation.  Since her last visit she called several weeks ago with worsening left lower quadrant abdominal pain.  She has a history of diverticulitis documented on CT in the sigmoid in 2019.  Her colonoscopy in January 2020 reveals diverticulosis.  She completed the Flagyl as this is the only antibiotic that she can tolerate and her pain improved but she continues to struggle with incomplete evacuation and spasm and constipation.  She is on MiraLAX daily, Colace, and milk of magnesia with no relief.  She has tried Linzess and Amitiza in the past with no relief.  She is also taken lactulose in the distant past with no relief.  Today she is frustrated with her symptoms and her constipation.  MB  4/27/2021 Mrs. Garcia presents for follow-up of reflux, constipation, and diverticulitis.  Since her last visit she was doing great on Motegrity however this was astronomically expensive for her.  She is now on 2 Colace and 1 Senokot at night with a good bowel movement daily.  She struggles tolerating fiber but has tried to increase her fiber in her diet.  She states her reflux stable Nexium daily.  Today she is doing well with no new GI complaints.  MB

## 2021-04-27 NOTE — PHYSICAL EXAM LUNGS:
[facility]   HISTORY AND PHYSICAL      Patient: Azael Fitzgerald Date: 6/15/2020   male, 31 year old  Admit Date: 6/14/2020   Attending: Wenceslao Rae MD        PRIMARY CARE PROVIDER:  No Pcp     ATTENDING PHYSICIAN    Wenceslao Rae MD      CHIEF COMPLAINT      Fever, back pain.  HPI    This is a 31 years old male with a medical history significant for IV drug abuse, depression, anxiety, reactive with disease patient continues to actively use heroin and methamphetamine.  He stated that a couple occasions over the last week he may have used dirty syringes with\" others blood in some of them\".  He presents to our emergency room complaining of chills, body ache, mid back pain, right hand numbness.  Patient also complains of left ankle pain.  His symptoms been progressive over the last week.  Patient also feels overall weak although he is able to ambulate.  He also smokes cigarettes.  No COVID contact.  No chest pain, or shortness of breath, no nausea, no vomiting, no sore throat no other complaints.  On presentation patient was febrile with temperature 101.3°, pulse rate 100, respiratory rate 20, blood pressure 113/63, pulse oximetry 100%.  Mild leukocytosis 11.6, elevated procalcitonin 0.15, normal lactic acid level.  Chest x-ray reveals no acute findings.  Patient is being admitted for further evaluation management.    PAST MEDICAL HISTORY    Past Medical History:   Diagnosis Date   • Anxiety    • Attention deficit disorder    • Decorative tattoo     both forearms   • Depression    • RAD (reactive airway disease)        SURGICAL HISTORY    Past Surgical History:   Procedure Laterality Date   • Circumcision baby  09/1990       FAMILY HISTORY    Family History   Problem Relation Age of Onset   • Cancer Mother    • Hypertension Father    • Asthma Sister    • Diabetes Maternal Grandmother    • Diabetes Paternal Grandfather    • Heart disease Other        SOCIAL HISTORY    Social History     Tobacco  clear to auscultation bilaterally , no wheezes, rales, rhonchi Use   • Smoking status: Current Every Day Smoker     Packs/day: 0.50     Types: Cigarettes     Start date: 1/1/2006   • Smokeless tobacco: Never Used   • Tobacco comment: trying   Substance Use Topics   • Alcohol use: Yes   • Drug use: Not Currently     Types: Methamphetamines, Marijuana, IV, Prescription Drugs     Comment: 2/2019 quit       CURRENT MEDICATIONS    Outpatient Medications Marked as Taking for the 6/14/20 encounter (Hospital Encounter)   Medication Sig Dispense Refill   • albuterol (PROAIR RESPICLICK) 108 (90 Base) MCG/ACT inhaler Inhale 2 puffs into the lungs every 4 hours as needed for Shortness of Breath or Wheezing. 1 Inhaler 5   • benzonatate (TESSALON PERLES) 200 MG capsule Take 1 capsule by mouth 3 times daily as needed for Cough. 30 capsule 0      Medications Prior to Admission   Medication Sig Dispense Refill   • albuterol (PROAIR RESPICLICK) 108 (90 Base) MCG/ACT inhaler Inhale 2 puffs into the lungs every 4 hours as needed for Shortness of Breath or Wheezing. 1 Inhaler 5   • benzonatate (TESSALON PERLES) 200 MG capsule Take 1 capsule by mouth 3 times daily as needed for Cough. 30 capsule 0       ALLERGIES    ALLERGIES:  No Known Allergies    REVIEW OF SYSTEMS    All 13 Review of Systems negative except for what's listed in the HPI.      PHYSICAL EXAM    Vital 24 Hour Range Most Recent Value   Temperature Temp  Min: 98.4 °F (36.9 °C)  Max: 101.3 °F (38.5 °C) 99.3 °F (37.4 °C)   Pulse Pulse  Min: 91  Max: 104 98   Respiratory Resp  Min: 10  Max: 20 12   Blood Pressure BP  Min: 96/61  Max: 127/59 102/57   Pulse Oximetry SpO2  Min: 95 %  Max: 100 % 99 %   Arterial BP No data recorded     O2 No data recorded       Vital Most Recent Value First Value   Weight 73.6 kg Weight: 73.6 kg   Height 6' 2\" (188 cm) Height: 6' 2\" (188 cm)   BMI 20.83 N/A       Examination:    Head and neck no JVD no thyromegaly.  Heart regular rate and rhythm no murmurs.  Lungs clear to auscultation bilaterally.  Abdomen  soft nontender positive bowel sounds.  Extremities multiple tracking marks.  Neurologically no focal deficit        LABS    Recent Labs   Lab 06/15/20  0121 06/15/20  0039   SODIUM  --  135   POTASSIUM  --  3.6   CHLORIDE  --  105   CO2  --  22   BUN  --  10   CREATININE  --  0.80   GLUCOSE  --  98   WBC 11.6*  --    HGB 12.6*  --    HCT 36.0*  --      --      No results found  No results found for this or any previous visit.   No results found for this or any previous visit.  Lab Results   Component Value Date    USPG 1.010 06/14/2020    UPROT Negative 06/14/2020    UWBC Negative 06/14/2020    URBC Negative 06/14/2020    UPH 7.0 06/14/2020    UBACTR NONE SEEN 03/18/2019       IMAGING & OTHER STUDIES    Xr Chest Pa And Lateral    Result Date: 6/15/2020  Narrative: XR CHEST PA AND LATERAL COMPARISON: 5/30/2019 HISTORY: Pain. Fever. FINDINGS: The heart size and pulmonary vasculature are normal. Lungs are clear. There are no effusions or pneumothorax.     Impression: IMPRESSION: No acute cardiopulmonary disease.    Xr Ankle 3+ View Left    Result Date: 6/15/2020  Narrative: XR ANKLE 3+ VW LEFT HISTORY: Pain and swelling. FINDINGS: There is no radiographically apparent soft tissue swelling. There is no joint effusion. There is no bony injury or dislocation.     Impression: IMPRESSION: Negative left ankle.       Assessment/Plan:       This is a 31 years old male with a medical history significant for IV drug abuse, depression, anxiety, reactive with disease patient continues to actively use heroin and methamphetamine.  Admitted with intractable midback pain, fever, cough, slight tachycardia:    Sepsis, source is unclear, rule out endocarditis, rule out epidural abscess.  Intractable back pain, associated with fever with IV drug abuse rule out epidural abscess  Injection with\" blood\", rule out allergic reaction related to blood injection.  IV drug abuse.  Ongoing tobacco abuse.    Admit to inpatient.  Blood  cultures.  Cover empirically with vancomycin, cefepime and Flagyl.  Echocardiogram.  MRI of the thoracic spine.  Counseled about drug and alcohol cessation  DVT prophylaxis heparin  Code status full code              Is the patient expected to require a two midnight stay in the hospital? Yes I certify that I expect inpatient services for greater than two midnights are medically necessary for this patient. Please see H&P and MD Progress Notes for additional information about the patient's course of treatment.          Wenceslao Rae MD    6/15/2020    4:11 AM

## 2021-05-11 ENCOUNTER — OFFICE VISIT (OUTPATIENT)
Dept: URBAN - NONMETROPOLITAN AREA CLINIC 2 | Facility: CLINIC | Age: 71
End: 2021-05-11

## 2021-08-28 ENCOUNTER — TELEPHONE ENCOUNTER (OUTPATIENT)
Dept: URBAN - METROPOLITAN AREA CLINIC 13 | Facility: CLINIC | Age: 71
End: 2021-08-28

## 2021-08-29 ENCOUNTER — TELEPHONE ENCOUNTER (OUTPATIENT)
Dept: URBAN - METROPOLITAN AREA CLINIC 13 | Facility: CLINIC | Age: 71
End: 2021-08-29

## 2021-12-29 ENCOUNTER — TELEPHONE ENCOUNTER (OUTPATIENT)
Dept: URBAN - NONMETROPOLITAN AREA CLINIC 2 | Facility: CLINIC | Age: 71
End: 2021-12-29

## 2021-12-29 RX ORDER — METRONIDAZOLE 500 MG/1
1 TABLET TABLET, FILM COATED ORAL THREE TIMES A DAY
Qty: 42 TABLET | Refills: 0 | OUTPATIENT
Start: 2021-12-30 | End: 2022-01-13

## 2021-12-29 RX ORDER — AMITRIPTYLINE HYDROCHLORIDE 25 MG/1
TAKE 1 TABLET (25 MG) BY ORAL ROUTE ONCE DAILY AT BEDTIME FOR 90 DAYS TABLET, FILM COATED ORAL 1
Qty: 90 | Refills: 3 | Status: ON HOLD | COMMUNITY
Start: 2017-10-13

## 2021-12-29 RX ORDER — CHLORTHALIDONE 25 MG/1
TAKE 1 TABLET (25 MG) BY ORAL ROUTE ONCE DAILY TABLET ORAL 1
Qty: 0 | Refills: 0 | Status: ACTIVE | COMMUNITY
Start: 1900-01-01

## 2021-12-29 RX ORDER — ESOMEPRAZOLE MAGNESIUM 20 MG/1
TAKE 1 CAPSULE (20 MG) BY ORAL ROUTE ONCE DAILY AT LEAST 1 HOUR BEFORE A MEAL SWALLOWING WHOLE. DO NOT CRUSH OR CHEW GRANULES CAPSULE, DELAYED RELEASE ORAL 1
Qty: 0 | Refills: 0 | Status: ACTIVE | COMMUNITY
Start: 1900-01-01

## 2021-12-29 RX ORDER — ASPIRIN 325 MG/1
TABLET ORAL
Qty: 0 | Refills: 0 | Status: ON HOLD | COMMUNITY
Start: 1900-01-01

## 2021-12-29 RX ORDER — HYOSCYAMINE SULFATE 0.12 MG/1
PLACE 1 TABLET UNDER TONGUE BY TRANSLINGUAL ROUTE EVERY 4 TO 6 HOURS AS NEEDED  SPASM/ABDOMINAL PAIN TABLET, ORALLY DISINTEGRATING ORAL THREE TIMES A DAY
Qty: 60 TABLET | Refills: 11 | Status: ACTIVE | COMMUNITY
Start: 2019-10-22 | End: 2022-03-17

## 2021-12-29 RX ORDER — TRAMADOL HYDROCHLORIDE 50 MG/1
TABLET ORAL
Qty: 0 | Refills: 0 | Status: ON HOLD | COMMUNITY
Start: 1900-01-01

## 2021-12-29 RX ORDER — MELOXICAM 15 MG/1
TAKE 0.5 TABLET BY ORAL ROUTE DAILY AS NEEDED TABLET ORAL 1
Qty: 0 | Refills: 0 | Status: ON HOLD | COMMUNITY
Start: 1900-01-01

## 2021-12-29 RX ORDER — PRUCALOPRIDE 2 MG/1
1 TABLET TABLET, FILM COATED ORAL ONCE A DAY
Qty: 90 TABLET | Refills: 3 | Status: ACTIVE | COMMUNITY
Start: 2021-03-09 | End: 2022-03-17

## 2021-12-29 RX ORDER — TRIAMTERENE AND HYDROCHLOROTHIAZIDE 37.5; 25 MG/1; MG/1
TAKE 1 TABLET BY ORAL ROUTE ONCE DAILY TABLET ORAL 1
Qty: 0 | Refills: 0 | Status: ON HOLD | COMMUNITY
Start: 1900-01-01

## 2021-12-29 RX ORDER — LISINOPRIL 20 MG/1
TAKE 1 TABLET (20 MG) BY ORAL ROUTE ONCE DAILY TABLET ORAL 1
Qty: 0 | Refills: 0 | Status: ON HOLD | COMMUNITY
Start: 1900-01-01

## 2022-03-14 ENCOUNTER — TELEPHONE ENCOUNTER (OUTPATIENT)
Dept: URBAN - METROPOLITAN AREA CLINIC 92 | Facility: CLINIC | Age: 72
End: 2022-03-14

## 2022-06-13 ENCOUNTER — OFFICE VISIT (OUTPATIENT)
Dept: URBAN - NONMETROPOLITAN AREA CLINIC 2 | Facility: CLINIC | Age: 72
End: 2022-06-13

## 2022-07-14 ENCOUNTER — LAB OUTSIDE AN ENCOUNTER (OUTPATIENT)
Dept: URBAN - NONMETROPOLITAN AREA CLINIC 2 | Facility: CLINIC | Age: 72
End: 2022-07-14

## 2022-07-14 ENCOUNTER — OFFICE VISIT (OUTPATIENT)
Dept: URBAN - NONMETROPOLITAN AREA CLINIC 2 | Facility: CLINIC | Age: 72
End: 2022-07-14
Payer: MEDICARE

## 2022-07-14 ENCOUNTER — WEB ENCOUNTER (OUTPATIENT)
Dept: URBAN - NONMETROPOLITAN AREA CLINIC 2 | Facility: CLINIC | Age: 72
End: 2022-07-14

## 2022-07-14 VITALS
DIASTOLIC BLOOD PRESSURE: 87 MMHG | SYSTOLIC BLOOD PRESSURE: 146 MMHG | BODY MASS INDEX: 35 KG/M2 | HEART RATE: 99 BPM | TEMPERATURE: 97.5 F | WEIGHT: 223 LBS | HEIGHT: 67 IN

## 2022-07-14 DIAGNOSIS — K59.04 CHRONIC IDIOPATHIC CONSTIPATION: ICD-10-CM

## 2022-07-14 DIAGNOSIS — K57.52 DIVERTICULITIS OF BOTH SMALL AND LARGE INTESTINE WITHOUT PERFORATION OR ABSCESS, UNSPECIFIED BLEEDING STATUS: ICD-10-CM

## 2022-07-14 DIAGNOSIS — K57.50 DIVERTICULOSIS OF BOTH SMALL AND LARGE INTESTINE WITHOUT BLEEDING: ICD-10-CM

## 2022-07-14 DIAGNOSIS — K21.9 GERD: ICD-10-CM

## 2022-07-14 PROCEDURE — 99214 OFFICE O/P EST MOD 30 MIN: CPT | Performed by: NURSE PRACTITIONER

## 2022-07-14 RX ORDER — CHLORTHALIDONE 25 MG/1
TAKE 1 TABLET (25 MG) BY ORAL ROUTE ONCE DAILY TABLET ORAL 1
Qty: 0 | Refills: 0 | Status: ON HOLD | COMMUNITY
Start: 1900-01-01

## 2022-07-14 RX ORDER — LISINOPRIL 20 MG/1
TAKE 1 TABLET (20 MG) BY ORAL ROUTE ONCE DAILY TABLET ORAL 1
Qty: 0 | Refills: 0 | Status: ON HOLD | COMMUNITY
Start: 1900-01-01

## 2022-07-14 RX ORDER — ESOMEPRAZOLE MAGNESIUM 20 MG/1
TAKE 1 CAPSULE (20 MG) BY ORAL ROUTE ONCE DAILY AT LEAST 1 HOUR BEFORE A MEAL SWALLOWING WHOLE. DO NOT CRUSH OR CHEW GRANULES CAPSULE, DELAYED RELEASE ORAL 1
Qty: 0 | Refills: 0 | Status: ON HOLD | COMMUNITY
Start: 1900-01-01

## 2022-07-14 RX ORDER — MELOXICAM 15 MG/1
TAKE 0.5 TABLET BY ORAL ROUTE DAILY AS NEEDED TABLET ORAL 1
Qty: 0 | Refills: 0 | Status: ON HOLD | COMMUNITY
Start: 1900-01-01

## 2022-07-14 RX ORDER — DOCUSATE SODIUM 100 MG/1
1 CAPSULE AS NEEDED CAPSULE, LIQUID FILLED ORAL ONCE A DAY
Status: ACTIVE | COMMUNITY

## 2022-07-14 RX ORDER — PRUCALOPRIDE 2 MG/1
1 TABLET TABLET, FILM COATED ORAL ONCE A DAY
Qty: 90 TABLET | Refills: 3 | OUTPATIENT
Start: 2022-07-14 | End: 2023-07-09

## 2022-07-14 RX ORDER — TRIAMTERENE AND HYDROCHLOROTHIAZIDE 37.5; 25 MG/1; MG/1
TAKE 1 TABLET BY ORAL ROUTE ONCE DAILY TABLET ORAL 1
Qty: 0 | Refills: 0 | Status: ON HOLD | COMMUNITY
Start: 1900-01-01

## 2022-07-14 RX ORDER — AMITRIPTYLINE HYDROCHLORIDE 25 MG/1
TAKE 1 TABLET (25 MG) BY ORAL ROUTE ONCE DAILY AT BEDTIME FOR 90 DAYS TABLET, FILM COATED ORAL 1
Qty: 90 | Refills: 3 | Status: ON HOLD | COMMUNITY
Start: 2017-10-13

## 2022-07-14 RX ORDER — HYDROCHLOROTHIAZIDE 25 MG/1
1 TABLET IN THE MORNING TABLET ORAL ONCE A DAY
Status: ACTIVE | COMMUNITY

## 2022-07-14 RX ORDER — ASPIRIN 325 MG/1
TABLET ORAL
Qty: 0 | Refills: 0 | Status: ON HOLD | COMMUNITY
Start: 1900-01-01

## 2022-07-14 RX ORDER — ZINC GLUCONATE 50 MG
1 TABLET TABLET ORAL ONCE A DAY
Status: ACTIVE | COMMUNITY

## 2022-07-14 RX ORDER — TRAMADOL HYDROCHLORIDE 50 MG/1
TABLET ORAL
Qty: 0 | Refills: 0 | Status: ON HOLD | COMMUNITY
Start: 1900-01-01

## 2022-07-14 RX ORDER — VALSARTAN 160 MG/1
1 TABLET TABLET, FILM COATED ORAL ONCE A DAY
Status: ACTIVE | COMMUNITY

## 2022-07-14 NOTE — PHYSICAL EXAM CONSTITUTIONAL:
alert , in no acute distress , well developed, well nourished Bactrim Pregnancy And Lactation Text: This medication is Pregnancy Category D and is known to cause fetal risk.  It is also excreted in breast milk.

## 2022-07-14 NOTE — HPI-TODAY'S VISIT:
8/20 Mrs. Carole Garcia is a 69-year-old female who presents via telephone/telehealth visit today for abdominal pain.  In the past she has followed with Dr. Polanco and Jazmyn.  She has history of diverticulitis.  Until 3 weeks ago, she was in her usual state of health and feeling well.  About that time she started to notice some abdominal bloating and distention with change in her bowel habits including smaller stools and less frequent stools.  She has been on Colace at bedtime for quite a while which has kept her regular.  She also takes Metamucil.  About a week ago she began to have various twinges of abdominal pain in the right lower quadrant and left upper quadrant.  She is concerned she may be developing diverticulitis.  No fever or chills.  The right lower quadrant pain resolved however she continues with the left mid to upper discomfort.  No tenderness when she pushes on this area. She has a CTA abdomen scheduled for Monday with her vascular surgeon.  She last had colonoscopy 1/2020 with a hyperplastic polyp in the ascending colon and diverticulosis of the sigmoid and descending colon-recommended to repeat in 5 years for personal history of polyps.  TG  9/10/2020 Ms. Garcia presents for follow up of diverticulitis. Since her last visit she is s/p flagyl (she is allergic to fluroquinlones). The pain has improved and yesterday she had a normal BM. At this point she is off AMT, she is on colace and mag citrate as needed. She is trying to take metamucil but can't tolerate the powder. She is taking her nexium 20mg daily. Her chronic abdominal pain has improved somewhat. Today she is doing better. MB  9/30/20  Ms Garcia returns to discuss possible diverticulitis flare. she reports worsening constipation in recent days despite metamucil and colace. She started having some lower midline pain and low grade temp. She has felt better in the last 24 hours, but still doesn't feel well. Her son's father is terminally ill and she will be having to go to [place] to see him. no additional complaints. SB   11/11/2020 Ms. Garcia presents for follow up of diverticulosis and GERD. Since her last visit she is doing better. She has been diagnosed with sleep apnea and placed on a CPAP. She did not take the bactrim as she is allergic to slufa drugs as well. She did take the flagyl for 14 days with relief. Her pain improved and at baseline. Her reflux is stable on nexium daily. Today she is doing well. MB  3/9/2021 Carole presents for follow-up of reflux, diverticulosis, history of diverticulitis, and constipation.  Since her last visit she called several weeks ago with worsening left lower quadrant abdominal pain.  She has a history of diverticulitis documented on CT in the sigmoid in 2019.  Her colonoscopy in January 2020 reveals diverticulosis.  She completed the Flagyl as this is the only antibiotic that she can tolerate and her pain improved but she continues to struggle with incomplete evacuation and spasm and constipation.  She is on MiraLAX daily, Colace, and milk of magnesia with no relief.  She has tried Linzess and Amitiza in the past with no relief.  She is also taken lactulose in the distant past with no relief.  Today she is frustrated with her symptoms and her constipation.  MB  4/27/2021 Mrs. Garcia presents for follow-up of reflux, constipation, and diverticulitis.  Since her last visit she was doing great on Motegrity however this was astronomically expensive for her.  She is now on 2 Colace and 1 Senokot at night with a good bowel movement daily.  She struggles tolerating fiber but has tried to increase her fiber in her diet.  She states her reflux stable Nexium daily.  Today she is doing well with no new GI complaints.  MB 7/14/2022 Carole presents for follow-up of constipation.  Since her last visit she continues to struggle with evacuation.  She has tenesmus and passes mucus at times.  We have failed an appeal with [employer] however this worked tremendously better in the past she would like to reappeal.  Today we have discussed evaluation for pelvic floor dysfunction given her surgical history.  We will check a sits marker study and consider the pelvic floor therapy pending results.  Her reflux is stable.  Her last colonoscopy was done in 2020 with 1 polyp.  She denies any flares of diverticulitis.  Today she is doing well other than her constipation.  MB

## 2022-08-09 ENCOUNTER — ERX REFILL RESPONSE (OUTPATIENT)
Dept: URBAN - NONMETROPOLITAN AREA CLINIC 2 | Facility: CLINIC | Age: 72
End: 2022-08-09

## 2022-08-09 RX ORDER — HYOSCYAMINE SULFATE 0.12 MG/1
PLACE ONE TABLET UNDER THE TONGUE EVERY 4 TO 6 HOURS AS NEEDED FOR SPASM/ABDOMINAL PAIN UP TO THREE TIMES A DAY TABLET SUBLINGUAL
Qty: 60 TABLET | Refills: 12 | OUTPATIENT

## 2022-08-09 RX ORDER — HYOSCYAMINE SULFATE 0.12 MG/1
PLACE ONE TABLET UNDER THE TONGUE EVERY 4 TO 6 HOURS AS NEEDED FOR SPASM/ABDOMINAL PAIN UP TO THREE TIMES A DAY TABLET SUBLINGUAL
Qty: 60 TABLET | Refills: 11 | OUTPATIENT

## 2022-08-17 ENCOUNTER — TELEPHONE ENCOUNTER (OUTPATIENT)
Dept: URBAN - METROPOLITAN AREA CLINIC 92 | Facility: CLINIC | Age: 72
End: 2022-08-17

## 2022-08-17 RX ORDER — PRUCALOPRIDE 2 MG/1
1 TABLET TABLET, FILM COATED ORAL ONCE A DAY
Qty: 90 TABLET | Refills: 3
Start: 2022-07-14 | End: 2023-08-17

## 2022-09-29 ENCOUNTER — OFFICE VISIT (OUTPATIENT)
Dept: URBAN - NONMETROPOLITAN AREA CLINIC 2 | Facility: CLINIC | Age: 72
End: 2022-09-29

## 2022-11-14 ENCOUNTER — LAB OUTSIDE AN ENCOUNTER (OUTPATIENT)
Dept: URBAN - NONMETROPOLITAN AREA CLINIC 2 | Facility: CLINIC | Age: 72
End: 2022-11-14

## 2022-11-14 ENCOUNTER — OFFICE VISIT (OUTPATIENT)
Dept: URBAN - NONMETROPOLITAN AREA CLINIC 2 | Facility: CLINIC | Age: 72
End: 2022-11-14
Payer: MEDICARE

## 2022-11-14 ENCOUNTER — WEB ENCOUNTER (OUTPATIENT)
Dept: URBAN - NONMETROPOLITAN AREA CLINIC 2 | Facility: CLINIC | Age: 72
End: 2022-11-14

## 2022-11-14 VITALS
HEIGHT: 67 IN | SYSTOLIC BLOOD PRESSURE: 149 MMHG | BODY MASS INDEX: 34.06 KG/M2 | HEART RATE: 79 BPM | WEIGHT: 217 LBS | TEMPERATURE: 97.5 F | DIASTOLIC BLOOD PRESSURE: 83 MMHG

## 2022-11-14 DIAGNOSIS — K59.04 CHRONIC IDIOPATHIC CONSTIPATION: ICD-10-CM

## 2022-11-14 DIAGNOSIS — K57.92 DIVERTICULITIS: ICD-10-CM

## 2022-11-14 DIAGNOSIS — K57.90 DIVERTICULOSIS: ICD-10-CM

## 2022-11-14 DIAGNOSIS — K21.9 GERD: ICD-10-CM

## 2022-11-14 DIAGNOSIS — Z86.010 PERSONAL HISTORY OF COLONIC POLYPS: ICD-10-CM

## 2022-11-14 PROCEDURE — 99214 OFFICE O/P EST MOD 30 MIN: CPT | Performed by: NURSE PRACTITIONER

## 2022-11-14 RX ORDER — CHLORTHALIDONE 25 MG/1
TAKE 1 TABLET (25 MG) BY ORAL ROUTE ONCE DAILY TABLET ORAL 1
Qty: 0 | Refills: 0 | Status: ON HOLD | COMMUNITY
Start: 1900-01-01

## 2022-11-14 RX ORDER — ROSUVASTATIN CALCIUM 20 MG/1
1 TABLET TABLET, FILM COATED ORAL ONCE A DAY
Status: ACTIVE | COMMUNITY

## 2022-11-14 RX ORDER — ESOMEPRAZOLE MAGNESIUM 40 MG/1
1 CAPSULE CAPSULE, DELAYED RELEASE ORAL ONCE A DAY
Qty: 90 CAPSULE | Refills: 3

## 2022-11-14 RX ORDER — TRIAMTERENE AND HYDROCHLOROTHIAZIDE 37.5; 25 MG/1; MG/1
TAKE 1 TABLET BY ORAL ROUTE ONCE DAILY TABLET ORAL 1
Qty: 0 | Refills: 0 | Status: ON HOLD | COMMUNITY
Start: 1900-01-01

## 2022-11-14 RX ORDER — HYOSCYAMINE SULFATE 0.12 MG/1
PLACE ONE TABLET UNDER THE TONGUE EVERY 4 TO 6 HOURS AS NEEDED FOR SPASM/ABDOMINAL PAIN UP TO THREE TIMES A DAY TABLET SUBLINGUAL
Qty: 60 TABLET | Refills: 11 | Status: ON HOLD | COMMUNITY

## 2022-11-14 RX ORDER — PRUCALOPRIDE 2 MG/1
1 TABLET TABLET, FILM COATED ORAL ONCE A DAY
Qty: 90 TABLET | Refills: 3 | Status: ACTIVE | COMMUNITY
Start: 2022-07-14 | End: 2023-08-17

## 2022-11-14 RX ORDER — DOCUSATE SODIUM 100 MG/1
1 CAPSULE AS NEEDED CAPSULE, LIQUID FILLED ORAL ONCE A DAY
Status: ON HOLD | COMMUNITY

## 2022-11-14 RX ORDER — TRAMADOL HYDROCHLORIDE 50 MG/1
TABLET ORAL
Qty: 0 | Refills: 0 | Status: ON HOLD | COMMUNITY
Start: 1900-01-01

## 2022-11-14 RX ORDER — VALSARTAN 160 MG/1
1 TABLET TABLET, FILM COATED ORAL ONCE A DAY
Status: ACTIVE | COMMUNITY

## 2022-11-14 RX ORDER — HYDROCHLOROTHIAZIDE 25 MG/1
1 TABLET IN THE MORNING TABLET ORAL ONCE A DAY
Status: ON HOLD | COMMUNITY

## 2022-11-14 RX ORDER — ZINC GLUCONATE 50 MG
1 TABLET TABLET ORAL ONCE A DAY
Status: ACTIVE | COMMUNITY

## 2022-11-14 RX ORDER — LISINOPRIL 20 MG/1
TAKE 1 TABLET (20 MG) BY ORAL ROUTE ONCE DAILY TABLET ORAL 1
Qty: 0 | Refills: 0 | Status: ON HOLD | COMMUNITY
Start: 1900-01-01

## 2022-11-14 RX ORDER — AMITRIPTYLINE HYDROCHLORIDE 25 MG/1
TAKE 1 TABLET (25 MG) BY ORAL ROUTE ONCE DAILY AT BEDTIME FOR 90 DAYS TABLET, FILM COATED ORAL 1
Qty: 90 | Refills: 3 | Status: ON HOLD | COMMUNITY
Start: 2017-10-13

## 2022-11-14 RX ORDER — HYDROCHLOROTHIAZIDE 25 MG/1
1 TABLET IN THE MORNING TABLET ORAL ONCE A DAY
Status: ACTIVE | COMMUNITY

## 2022-11-14 RX ORDER — MELOXICAM 15 MG/1
TAKE 0.5 TABLET BY ORAL ROUTE DAILY AS NEEDED TABLET ORAL 1
Qty: 0 | Refills: 0 | Status: ON HOLD | COMMUNITY
Start: 1900-01-01

## 2022-11-14 RX ORDER — ASPIRIN 325 MG/1
TABLET ORAL
Qty: 0 | Refills: 0 | Status: ON HOLD | COMMUNITY
Start: 1900-01-01

## 2022-11-14 RX ORDER — ESOMEPRAZOLE MAGNESIUM 20 MG/1
TAKE 1 CAPSULE (20 MG) BY ORAL ROUTE ONCE DAILY AT LEAST 1 HOUR BEFORE A MEAL SWALLOWING WHOLE. DO NOT CRUSH OR CHEW GRANULES CAPSULE, DELAYED RELEASE ORAL 1
Qty: 0 | Refills: 0 | Status: ON HOLD | COMMUNITY
Start: 1900-01-01

## 2022-11-14 NOTE — HPI-TODAY'S VISIT:
8/20 Mrs. Carole Garcia is a 69-year-old female who presents via telephone/telehealth visit today for abdominal pain.  In the past she has followed with Dr. Polanco and Jazmyn.  She has history of diverticulitis.  Until 3 weeks ago, she was in her usual state of health and feeling well.  About that time she started to notice some abdominal bloating and distention with change in her bowel habits including smaller stools and less frequent stools.  She has been on Colace at bedtime for quite a while which has kept her regular.  She also takes Metamucil.  About a week ago she began to have various twinges of abdominal pain in the right lower quadrant and left upper quadrant.  She is concerned she may be developing diverticulitis.  No fever or chills.  The right lower quadrant pain resolved however she continues with the left mid to upper discomfort.  No tenderness when she pushes on this area. She has a CTA abdomen scheduled for Monday with her vascular surgeon.  She last had colonoscopy 1/2020 with a hyperplastic polyp in the ascending colon and diverticulosis of the sigmoid and descending colon-recommended to repeat in 5 years for personal history of polyps.  TG  9/10/2020 Ms. Garcia presents for follow up of diverticulitis. Since her last visit she is s/p flagyl (she is allergic to fluroquinlones). The pain has improved and yesterday she had a normal BM. At this point she is off AMT, she is on colace and mag citrate as needed. She is trying to take metamucil but can't tolerate the powder. She is taking her nexium 20mg daily. Her chronic abdominal pain has improved somewhat. Today she is doing better. MB  9/30/20  Ms Garcia returns to discuss possible diverticulitis flare. she reports worsening constipation in recent days despite metamucil and colace. She started having some lower midline pain and low grade temp. She has felt better in the last 24 hours, but still doesn't feel well. Her son's father is terminally ill and she will be having to go to [place] to see him. no additional complaints. SB   11/11/2020 Ms. Garcia presents for follow up of diverticulosis and GERD. Since her last visit she is doing better. She has been diagnosed with sleep apnea and placed on a CPAP. She did not take the bactrim as she is allergic to slufa drugs as well. She did take the flagyl for 14 days with relief. Her pain improved and at baseline. Her reflux is stable on nexium daily. Today she is doing well. MB  3/9/2021 Carole presents for follow-up of reflux, diverticulosis, history of diverticulitis, and constipation.  Since her last visit she called several weeks ago with worsening left lower quadrant abdominal pain.  She has a history of diverticulitis documented on CT in the sigmoid in 2019.  Her colonoscopy in January 2020 reveals diverticulosis.  She completed the Flagyl as this is the only antibiotic that she can tolerate and her pain improved but she continues to struggle with incomplete evacuation and spasm and constipation.  She is on MiraLAX daily, Colace, and milk of magnesia with no relief.  She has tried Linzess and Amitiza in the past with no relief.  She is also taken lactulose in the distant past with no relief.  Today she is frustrated with her symptoms and her constipation.  MB  4/27/2021 Mrs. Garcia presents for follow-up of reflux, constipation, and diverticulitis.  Since her last visit she was doing great on Motegrity however this was astronomically expensive for her.  She is now on 2 Colace and 1 Senokot at night with a good bowel movement daily.  She struggles tolerating fiber but has tried to increase her fiber in her diet.  She states her reflux stable Nexium daily.  Today she is doing well with no new GI complaints.  MB 7/14/2022 Carole presents for follow-up of constipation.  Since her last visit she continues to struggle with evacuation.  She has tenesmus and passes mucus at times.  We have failed an appeal with [employer] however this worked tremendously better in the past she would like to reappeal.  Today we have discussed evaluation for pelvic floor dysfunction given her surgical history.  We will check a sits marker study and consider the pelvic floor therapy pending results.  Her reflux is stable.  Her last colonoscopy was done in 2020 with 1 polyp.  She denies any flares of diverticulitis.  Today she is doing well other than her constipation.  MB 11/14/2022 Carole presents for follow-up of reflux, bloating, constipation with slow transit, and history of diverticulitis.  Since her last visit we did appeal for Motegrity and this was approved.  This is no longer working with the same result.  She continues to go days without a bowel movement and then has to use a Dulcolax suppository for relief.  She never has the urge to have a bowel movement.  Her colonoscopy in 2020 is normal other than diverticular disease.  She has a history of prolapse repair, we have discussed she likely has a component of pelvic floor dysfunction.  I am going to refer her to pelvic floor therapy for further evaluation.  She is also had increased reflux with heartburn.  She has dysphagia to solids.  She is on Nexium 20 mg daily.  Her last EGD was in 2015 with reflux and gastritis.  She agrees to repeat EGD with dilation.  She is concerned about small intestinal bacterial overgrowth, we will biopsy her small bowel at the time of her procedure.  I suspect that her symptoms are multifactorial however ultimately her bloating is likely related to her constipation.  She has not lost any weight.  We will follow-up pending her endoscopic results. MB

## 2022-11-16 ENCOUNTER — TELEPHONE ENCOUNTER (OUTPATIENT)
Dept: URBAN - NONMETROPOLITAN AREA CLINIC 2 | Facility: CLINIC | Age: 72
End: 2022-11-16

## 2022-11-16 RX ORDER — ESOMEPRAZOLE MAGNESIUM 40 MG/1
1 CAPSULE CAPSULE, DELAYED RELEASE ORAL ONCE A DAY
Qty: 90 | Refills: 3

## 2023-01-04 ENCOUNTER — TELEPHONE ENCOUNTER (OUTPATIENT)
Dept: URBAN - NONMETROPOLITAN AREA CLINIC 2 | Facility: CLINIC | Age: 73
End: 2023-01-04

## 2023-01-05 ENCOUNTER — OFFICE VISIT (OUTPATIENT)
Dept: URBAN - METROPOLITAN AREA MEDICAL CENTER 1 | Facility: MEDICAL CENTER | Age: 73
End: 2023-01-05
Payer: MEDICARE

## 2023-01-05 ENCOUNTER — LAB OUTSIDE AN ENCOUNTER (OUTPATIENT)
Dept: URBAN - NONMETROPOLITAN AREA CLINIC 2 | Facility: CLINIC | Age: 73
End: 2023-01-05

## 2023-01-05 DIAGNOSIS — K22.2 ACQUIRED ESOPHAGEAL RING: ICD-10-CM

## 2023-01-05 DIAGNOSIS — K20.80 ESOPHAGITIS DISSECANS SUPERFICIALIS: ICD-10-CM

## 2023-01-05 DIAGNOSIS — K29.60 ADENOPAPILLOMATOSIS GASTRICA: ICD-10-CM

## 2023-01-05 DIAGNOSIS — K31.89 ACQUIRED DEFORMITY OF DUODENUM: ICD-10-CM

## 2023-01-05 PROCEDURE — 43239 EGD BIOPSY SINGLE/MULTIPLE: CPT | Performed by: INTERNAL MEDICINE

## 2023-01-05 PROCEDURE — 43249 ESOPH EGD DILATION <30 MM: CPT | Performed by: INTERNAL MEDICINE

## 2023-01-06 LAB
AP CASE REPORT: (no result)
AP FINAL DIAGNOSIS: (no result)
AP GROSS DESCRIPTION: (no result)
AP MICROSCOPIC DESCRIPTION: (no result)

## 2023-01-13 ENCOUNTER — TELEPHONE ENCOUNTER (OUTPATIENT)
Dept: URBAN - NONMETROPOLITAN AREA CLINIC 2 | Facility: CLINIC | Age: 73
End: 2023-01-13

## 2023-01-30 ENCOUNTER — OFFICE VISIT (OUTPATIENT)
Dept: URBAN - NONMETROPOLITAN AREA CLINIC 2 | Facility: CLINIC | Age: 73
End: 2023-01-30
Payer: MEDICARE

## 2023-01-30 VITALS
SYSTOLIC BLOOD PRESSURE: 114 MMHG | WEIGHT: 209 LBS | DIASTOLIC BLOOD PRESSURE: 72 MMHG | HEIGHT: 67 IN | BODY MASS INDEX: 32.8 KG/M2 | HEART RATE: 90 BPM

## 2023-01-30 DIAGNOSIS — K57.90 DIVERTICULOSIS: ICD-10-CM

## 2023-01-30 DIAGNOSIS — K59.04 CHRONIC IDIOPATHIC CONSTIPATION: ICD-10-CM

## 2023-01-30 DIAGNOSIS — K57.92 DIVERTICULITIS: ICD-10-CM

## 2023-01-30 DIAGNOSIS — K21.9 GERD: ICD-10-CM

## 2023-01-30 DIAGNOSIS — Z86.010 PERSONAL HISTORY OF COLONIC POLYPS: ICD-10-CM

## 2023-01-30 PROBLEM — 428283002: Status: ACTIVE | Noted: 2020-08-27

## 2023-01-30 PROCEDURE — 99215 OFFICE O/P EST HI 40 MIN: CPT

## 2023-01-30 RX ORDER — DOCUSATE SODIUM 100 MG/1
1 CAPSULE AS NEEDED CAPSULE, LIQUID FILLED ORAL ONCE A DAY
Status: ON HOLD | COMMUNITY

## 2023-01-30 RX ORDER — TRAMADOL HYDROCHLORIDE 50 MG/1
TABLET ORAL
Qty: 0 | Refills: 0 | Status: ON HOLD | COMMUNITY
Start: 1900-01-01

## 2023-01-30 RX ORDER — MELOXICAM 15 MG/1
TAKE 0.5 TABLET BY ORAL ROUTE DAILY AS NEEDED TABLET ORAL 1
Qty: 0 | Refills: 0 | Status: ON HOLD | COMMUNITY
Start: 1900-01-01

## 2023-01-30 RX ORDER — ZINC GLUCONATE 50 MG
1 TABLET TABLET ORAL ONCE A DAY
Status: ACTIVE | COMMUNITY

## 2023-01-30 RX ORDER — ESOMEPRAZOLE MAGNESIUM 40 MG/1
1 CAPSULE CAPSULE, DELAYED RELEASE ORAL ONCE A DAY
Qty: 90 | Refills: 3 | Status: ACTIVE | COMMUNITY

## 2023-01-30 RX ORDER — TRIAMTERENE AND HYDROCHLOROTHIAZIDE 37.5; 25 MG/1; MG/1
TAKE 1 TABLET BY ORAL ROUTE ONCE DAILY TABLET ORAL 1
Qty: 0 | Refills: 0 | Status: ON HOLD | COMMUNITY
Start: 1900-01-01

## 2023-01-30 RX ORDER — HYDROCHLOROTHIAZIDE 25 MG/1
1 TABLET IN THE MORNING TABLET ORAL ONCE A DAY
Status: ON HOLD | COMMUNITY

## 2023-01-30 RX ORDER — ROSUVASTATIN CALCIUM 20 MG/1
1 TABLET TABLET, FILM COATED ORAL ONCE A DAY
Status: ACTIVE | COMMUNITY

## 2023-01-30 RX ORDER — PRUCALOPRIDE 2 MG/1
1 TABLET TABLET, FILM COATED ORAL ONCE A DAY
Qty: 90 TABLET | Refills: 3 | Status: ACTIVE | COMMUNITY
Start: 2022-07-14 | End: 2023-08-17

## 2023-01-30 RX ORDER — AMITRIPTYLINE HYDROCHLORIDE 25 MG/1
TAKE 1 TABLET (25 MG) BY ORAL ROUTE ONCE DAILY AT BEDTIME FOR 90 DAYS TABLET, FILM COATED ORAL 1
Qty: 90 | Refills: 3 | Status: ON HOLD | COMMUNITY
Start: 2017-10-13

## 2023-01-30 RX ORDER — CHLORTHALIDONE 25 MG/1
TAKE 1 TABLET (25 MG) BY ORAL ROUTE ONCE DAILY TABLET ORAL 1
Qty: 0 | Refills: 0 | Status: ON HOLD | COMMUNITY
Start: 1900-01-01

## 2023-01-30 RX ORDER — SEMAGLUTIDE 1.34 MG/ML
AS DIRECTED INJECTION, SOLUTION SUBCUTANEOUS
Status: ACTIVE | COMMUNITY

## 2023-01-30 RX ORDER — ASPIRIN 325 MG/1
TABLET ORAL
Qty: 0 | Refills: 0 | Status: ON HOLD | COMMUNITY
Start: 1900-01-01

## 2023-01-30 RX ORDER — LISINOPRIL 20 MG/1
TAKE 1 TABLET (20 MG) BY ORAL ROUTE ONCE DAILY TABLET ORAL 1
Qty: 0 | Refills: 0 | Status: ON HOLD | COMMUNITY
Start: 1900-01-01

## 2023-01-30 RX ORDER — VALSARTAN 160 MG/1
1 TABLET TABLET, FILM COATED ORAL ONCE A DAY
Status: ACTIVE | COMMUNITY

## 2023-01-30 RX ORDER — HYOSCYAMINE SULFATE 0.12 MG/1
PLACE ONE TABLET UNDER THE TONGUE EVERY 4 TO 6 HOURS AS NEEDED FOR SPASM/ABDOMINAL PAIN UP TO THREE TIMES A DAY TABLET SUBLINGUAL
Qty: 60 TABLET | Refills: 11 | Status: ON HOLD | COMMUNITY

## 2023-01-30 RX ORDER — HYDROCHLOROTHIAZIDE 25 MG/1
1 TABLET IN THE MORNING TABLET ORAL ONCE A DAY
Status: ACTIVE | COMMUNITY

## 2023-01-30 NOTE — PHYSICAL EXAM HENT:
Head,  normocephalic,  atraumatic,  Face,  Face within normal limits,  Ears,  External ears within normal limits,  Nose/Nasopharynx,  External nose  normal appearance,  nares patent,  no nasal discharge,  Mouth and Throat,  Oral cavity appearance normal,  Breath odor normal,  Lips,  Appearance normal RN, Thoracic Sx, ID

## 2023-01-30 NOTE — HPI-TODAY'S VISIT:
8/20 Mrs. Carole Garcia is a 69-year-old female who presents via telephone/telehealth visit today for abdominal pain.  In the past she has followed with Dr. Polanco and Jazmyn.  She has history of diverticulitis.  Until 3 weeks ago, she was in her usual state of health and feeling well.  About that time she started to notice some abdominal bloating and distention with change in her bowel habits including smaller stools and less frequent stools.  She has been on Colace at bedtime for quite a while which has kept her regular.  She also takes Metamucil.  About a week ago she began to have various twinges of abdominal pain in the right lower quadrant and left upper quadrant.  She is concerned she may be developing diverticulitis.  No fever or chills.  The right lower quadrant pain resolved however she continues with the left mid to upper discomfort.  No tenderness when she pushes on this area. She has a CTA abdomen scheduled for Monday with her vascular surgeon.  She last had colonoscopy 1/2020 with a hyperplastic polyp in the ascending colon and diverticulosis of the sigmoid and descending colon-recommended to repeat in 5 years for personal history of polyps.  TG  9/10/2020 Ms. Garcia presents for follow up of diverticulitis. Since her last visit she is s/p flagyl (she is allergic to fluroquinlones). The pain has improved and yesterday she had a normal BM. At this point she is off AMT, she is on colace and mag citrate as needed. She is trying to take metamucil but can't tolerate the powder. She is taking her nexium 20mg daily. Her chronic abdominal pain has improved somewhat. Today she is doing better. MB  9/30/20  Ms Garcia returns to discuss possible diverticulitis flare. she reports worsening constipation in recent days despite metamucil and colace. She started having some lower midline pain and low grade temp. She has felt better in the last 24 hours, but still doesn't feel well. Her son's father is terminally ill and she will be having to go to [place] to see him. no additional complaints. SB   11/11/2020 Ms. Garcia presents for follow up of diverticulosis and GERD. Since her last visit she is doing better. She has been diagnosed with sleep apnea and placed on a CPAP. She did not take the bactrim as she is allergic to slufa drugs as well. She did take the flagyl for 14 days with relief. Her pain improved and at baseline. Her reflux is stable on nexium daily. Today she is doing well. MB  3/9/2021 Carole presents for follow-up of reflux, diverticulosis, history of diverticulitis, and constipation.  Since her last visit she called several weeks ago with worsening left lower quadrant abdominal pain.  She has a history of diverticulitis documented on CT in the sigmoid in 2019.  Her colonoscopy in January 2020 reveals diverticulosis.  She completed the Flagyl as this is the only antibiotic that she can tolerate and her pain improved but she continues to struggle with incomplete evacuation and spasm and constipation.  She is on MiraLAX daily, Colace, and milk of magnesia with no relief.  She has tried Linzess and Amitiza in the past with no relief.  She is also taken lactulose in the distant past with no relief.  Today she is frustrated with her symptoms and her constipation.  MB  4/27/2021 Mrs. Garcia presents for follow-up of reflux, constipation, and diverticulitis.  Since her last visit she was doing great on Motegrity however this was astronomically expensive for her.  She is now on 2 Colace and 1 Senokot at night with a good bowel movement daily.  She struggles tolerating fiber but has tried to increase her fiber in her diet.  She states her reflux stable Nexium daily.  Today she is doing well with no new GI complaints.  MB 7/14/2022 Carole presents for follow-up of constipation.  Since her last visit she continues to struggle with evacuation.  She has tenesmus and passes mucus at times.  We have failed an appeal with [employer] however this worked tremendously better in the past she would like to reappeal.  Today we have discussed evaluation for pelvic floor dysfunction given her surgical history.  We will check a sits marker study and consider the pelvic floor therapy pending results.  Her reflux is stable.  Her last colonoscopy was done in 2020 with 1 polyp.  She denies any flares of diverticulitis.  Today she is doing well other than her constipation.  MB 11/14/2022 Carole presents for follow-up of reflux, bloating, constipation with slow transit, and history of diverticulitis.  Since her last visit we did appeal for Motegrity and this was approved.  This is no longer working with the same result.  She continues to go days without a bowel movement and then has to use a Dulcolax suppository for relief.  She never has the urge to have a bowel movement.  Her colonoscopy in 2020 is normal other than diverticular disease.  She has a history of prolapse repair, we have discussed she likely has a component of pelvic floor dysfunction.  I am going to refer her to pelvic floor therapy for further evaluation.  She is also had increased reflux with heartburn.  She has dysphagia to solids.  She is on Nexium 20 mg daily.  Her last EGD was in 2015 with reflux and gastritis.  She agrees to repeat EGD with dilation.  She is concerned about small intestinal bacterial overgrowth, we will biopsy her small bowel at the time of her procedure.  I suspect that her symptoms are multifactorial however ultimately her bloating is likely related to her constipation.  She has not lost any weight.  We will follow-up pending her endoscopic results. MB 1/30/2023 Ms. Lam returns to clinic for follow-up of reflux, bloating, constipation and slow transit history of diverticulitis.  She previously underwent upper endoscopy earlier this month which showed nonsevere esophagitis with nonconcerning pathology.  2 cm hiatal hernia, nonobstructing Schatzki ring dilated with an 18-19-20 mm balloon dilator.  Diffuse mild inflammation was found in the gastric antrum.  Large nonbleeding diverticulum was found in the second portion of the duodenum. Continues on Nexium 40 mg daily, had an episode 2 weeks ago with really bad reflux stating the pain was so bad it was burning into her back and gums giving her ulcers in her mouth.  This dissipated on its own.  She does endorse that she could have had some type of viral illness.  She continues on Motegrity 2 mg daily with MiraLAX and no Colace.  She states at this point she has no urge to defecate and will be presenting for evaluation with pelvic floor therapy in March.  She started new probiotic that her daughter-in-law recommended which is herbal from [employer] and it's helping. She is seeing vascular soon due to her significant family history.  Has not tried FD guard but is willing to start.  SP

## 2023-02-27 ENCOUNTER — OFFICE VISIT (OUTPATIENT)
Dept: URBAN - NONMETROPOLITAN AREA CLINIC 2 | Facility: CLINIC | Age: 73
End: 2023-02-27

## 2023-04-12 ENCOUNTER — OFFICE VISIT (OUTPATIENT)
Dept: URBAN - NONMETROPOLITAN AREA CLINIC 13 | Facility: CLINIC | Age: 73
End: 2023-04-12
Payer: MEDICARE

## 2023-04-12 ENCOUNTER — WEB ENCOUNTER (OUTPATIENT)
Dept: URBAN - NONMETROPOLITAN AREA CLINIC 13 | Facility: CLINIC | Age: 73
End: 2023-04-12

## 2023-04-12 VITALS
BODY MASS INDEX: 31.86 KG/M2 | HEIGHT: 67 IN | WEIGHT: 203 LBS | DIASTOLIC BLOOD PRESSURE: 88 MMHG | HEART RATE: 78 BPM | SYSTOLIC BLOOD PRESSURE: 136 MMHG | TEMPERATURE: 98.5 F

## 2023-04-12 DIAGNOSIS — K57.10 DUODENAL DIVERTICULUM: ICD-10-CM

## 2023-04-12 DIAGNOSIS — K57.92 DIVERTICULITIS: ICD-10-CM

## 2023-04-12 DIAGNOSIS — K59.04 CHRONIC IDIOPATHIC CONSTIPATION: ICD-10-CM

## 2023-04-12 DIAGNOSIS — R10.13 EPIGASTRIC ABDOMINAL PAIN: ICD-10-CM

## 2023-04-12 DIAGNOSIS — K57.90 DIVERTICULOSIS: ICD-10-CM

## 2023-04-12 DIAGNOSIS — Z86.010 PERSONAL HISTORY OF COLONIC POLYPS: ICD-10-CM

## 2023-04-12 PROBLEM — 762275008: Status: ACTIVE | Noted: 2023-04-12

## 2023-04-12 PROBLEM — 307496006: Status: ACTIVE | Noted: 2021-03-09

## 2023-04-12 PROCEDURE — 99214 OFFICE O/P EST MOD 30 MIN: CPT | Performed by: NURSE PRACTITIONER

## 2023-04-12 RX ORDER — DOCUSATE SODIUM 100 MG/1
1 CAPSULE AS NEEDED CAPSULE, LIQUID FILLED ORAL ONCE A DAY
Status: ON HOLD | COMMUNITY

## 2023-04-12 RX ORDER — AMITRIPTYLINE HYDROCHLORIDE 25 MG/1
TAKE 1 TABLET (25 MG) BY ORAL ROUTE ONCE DAILY AT BEDTIME FOR 90 DAYS TABLET, FILM COATED ORAL 1
Qty: 90 | Refills: 3 | Status: ON HOLD | COMMUNITY
Start: 2017-10-13

## 2023-04-12 RX ORDER — LISINOPRIL 20 MG/1
TAKE 1 TABLET (20 MG) BY ORAL ROUTE ONCE DAILY TABLET ORAL 1
Qty: 0 | Refills: 0 | Status: ON HOLD | COMMUNITY
Start: 1900-01-01

## 2023-04-12 RX ORDER — TRIAMTERENE AND HYDROCHLOROTHIAZIDE 37.5; 25 MG/1; MG/1
TAKE 1 TABLET BY ORAL ROUTE ONCE DAILY TABLET ORAL 1
Qty: 0 | Refills: 0 | Status: ON HOLD | COMMUNITY
Start: 1900-01-01

## 2023-04-12 RX ORDER — SEMAGLUTIDE 1.34 MG/ML
AS DIRECTED INJECTION, SOLUTION SUBCUTANEOUS
Status: ACTIVE | COMMUNITY

## 2023-04-12 RX ORDER — ASPIRIN 325 MG/1
TABLET ORAL
Qty: 0 | Refills: 0 | Status: ON HOLD | COMMUNITY
Start: 1900-01-01

## 2023-04-12 RX ORDER — ESOMEPRAZOLE MAGNESIUM 40 MG/1
1 CAPSULE CAPSULE, DELAYED RELEASE ORAL ONCE A DAY
Qty: 90 | Refills: 3 | Status: ACTIVE | COMMUNITY

## 2023-04-12 RX ORDER — PRUCALOPRIDE 2 MG/1
1 TABLET TABLET, FILM COATED ORAL ONCE A DAY
Qty: 90 TABLET | Refills: 3 | Status: ACTIVE | COMMUNITY
Start: 2022-07-14 | End: 2023-08-17

## 2023-04-12 RX ORDER — VALSARTAN 160 MG/1
1 TABLET TABLET, FILM COATED ORAL ONCE A DAY
Status: ACTIVE | COMMUNITY

## 2023-04-12 RX ORDER — ROSUVASTATIN CALCIUM 20 MG/1
1 TABLET TABLET, FILM COATED ORAL ONCE A DAY
Status: ACTIVE | COMMUNITY

## 2023-04-12 RX ORDER — MELOXICAM 15 MG/1
TAKE 0.5 TABLET BY ORAL ROUTE DAILY AS NEEDED TABLET ORAL 1
Qty: 0 | Refills: 0 | Status: ON HOLD | COMMUNITY
Start: 1900-01-01

## 2023-04-12 RX ORDER — BUSPIRONE HYDROCHLORIDE 10 MG/1
1 TABLET TABLET ORAL TWICE A DAY
Qty: 180 TABLET | Refills: 3 | OUTPATIENT
Start: 2023-04-12

## 2023-04-12 RX ORDER — ZINC GLUCONATE 50 MG
1 TABLET TABLET ORAL ONCE A DAY
Status: ACTIVE | COMMUNITY

## 2023-04-12 RX ORDER — HYDROCHLOROTHIAZIDE 25 MG/1
1 TABLET IN THE MORNING TABLET ORAL ONCE A DAY
Status: ON HOLD | COMMUNITY

## 2023-04-12 RX ORDER — CHLORTHALIDONE 25 MG/1
TAKE 1 TABLET (25 MG) BY ORAL ROUTE ONCE DAILY TABLET ORAL 1
Qty: 0 | Refills: 0 | Status: ON HOLD | COMMUNITY
Start: 1900-01-01

## 2023-04-12 RX ORDER — HYDROCHLOROTHIAZIDE 25 MG/1
1 TABLET IN THE MORNING TABLET ORAL ONCE A DAY
Status: ACTIVE | COMMUNITY

## 2023-04-12 RX ORDER — HYOSCYAMINE SULFATE 0.12 MG/1
PLACE ONE TABLET UNDER THE TONGUE EVERY 4 TO 6 HOURS AS NEEDED FOR SPASM/ABDOMINAL PAIN UP TO THREE TIMES A DAY TABLET SUBLINGUAL
Qty: 60 TABLET | Refills: 11 | Status: ON HOLD | COMMUNITY

## 2023-04-12 RX ORDER — TRAMADOL HYDROCHLORIDE 50 MG/1
TABLET ORAL
Qty: 0 | Refills: 0 | Status: ON HOLD | COMMUNITY
Start: 1900-01-01

## 2023-04-12 RX ORDER — TENAPANOR HYDROCHLORIDE 53.2 MG/1
1 TABLET IMMEDIATELY BEFORE MEALS TABLET ORAL TWICE A DAY
Qty: 60 TABLET | Refills: 11 | OUTPATIENT
Start: 2023-04-12 | End: 2024-04-06

## 2023-04-12 NOTE — HPI-TODAY'S VISIT:
8/20 Mrs. Carole Garcia is a 69-year-old female who presents via telephone/telehealth visit today for abdominal pain.  In the past she has followed with Dr. Polanco and Jazmyn.  She has history of diverticulitis.  Until 3 weeks ago, she was in her usual state of health and feeling well.  About that time she started to notice some abdominal bloating and distention with change in her bowel habits including smaller stools and less frequent stools.  She has been on Colace at bedtime for quite a while which has kept her regular.  She also takes Metamucil.  About a week ago she began to have various twinges of abdominal pain in the right lower quadrant and left upper quadrant.  She is concerned she may be developing diverticulitis.  No fever or chills.  The right lower quadrant pain resolved however she continues with the left mid to upper discomfort.  No tenderness when she pushes on this area. She has a CTA abdomen scheduled for Monday with her vascular surgeon.  She last had colonoscopy 1/2020 with a hyperplastic polyp in the ascending colon and diverticulosis of the sigmoid and descending colon-recommended to repeat in 5 years for personal history of polyps.  TG  9/10/2020 Ms. Garcia presents for follow up of diverticulitis. Since her last visit she is s/p flagyl (she is allergic to fluroquinlones). The pain has improved and yesterday she had a normal BM. At this point she is off AMT, she is on colace and mag citrate as needed. She is trying to take metamucil but can't tolerate the powder. She is taking her nexium 20mg daily. Her chronic abdominal pain has improved somewhat. Today she is doing better. MB  9/30/20  Ms Garcia returns to discuss possible diverticulitis flare. she reports worsening constipation in recent days despite metamucil and colace. She started having some lower midline pain and low grade temp. She has felt better in the last 24 hours, but still doesn't feel well. Her son's father is terminally ill and she will be having to go to [place] to see him. no additional complaints. SB   11/11/2020 Ms. Garcia presents for follow up of diverticulosis and GERD. Since her last visit she is doing better. She has been diagnosed with sleep apnea and placed on a CPAP. She did not take the bactrim as she is allergic to slufa drugs as well. She did take the flagyl for 14 days with relief. Her pain improved and at baseline. Her reflux is stable on nexium daily. Today she is doing well. MB  3/9/2021 Carole presents for follow-up of reflux, diverticulosis, history of diverticulitis, and constipation.  Since her last visit she called several weeks ago with worsening left lower quadrant abdominal pain.  She has a history of diverticulitis documented on CT in the sigmoid in 2019.  Her colonoscopy in January 2020 reveals diverticulosis.  She completed the Flagyl as this is the only antibiotic that she can tolerate and her pain improved but she continues to struggle with incomplete evacuation and spasm and constipation.  She is on MiraLAX daily, Colace, and milk of magnesia with no relief.  She has tried Linzess and Amitiza in the past with no relief.  She is also taken lactulose in the distant past with no relief.  Today she is frustrated with her symptoms and her constipation.  MB  4/27/2021 Mrs. Garcia presents for follow-up of reflux, constipation, and diverticulitis.  Since her last visit she was doing great on Motegrity however this was astronomically expensive for her.  She is now on 2 Colace and 1 Senokot at night with a good bowel movement daily.  She struggles tolerating fiber but has tried to increase her fiber in her diet.  She states her reflux stable Nexium daily.  Today she is doing well with no new GI complaints.  MB 7/14/2022 Carole presents for follow-up of constipation.  Since her last visit she continues to struggle with evacuation.  She has tenesmus and passes mucus at times.  We have failed an appeal with [employer] however this worked tremendously better in the past she would like to reappeal.  Today we have discussed evaluation for pelvic floor dysfunction given her surgical history.  We will check a sits marker study and consider the pelvic floor therapy pending results.  Her reflux is stable.  Her last colonoscopy was done in 2020 with 1 polyp.  She denies any flares of diverticulitis.  Today she is doing well other than her constipation.  MB 11/14/2022 Carole presents for follow-up of reflux, bloating, constipation with slow transit, and history of diverticulitis.  Since her last visit we did appeal for Motegrity and this was approved.  This is no longer working with the same result.  She continues to go days without a bowel movement and then has to use a Dulcolax suppository for relief.  She never has the urge to have a bowel movement.  Her colonoscopy in 2020 is normal other than diverticular disease.  She has a history of prolapse repair, we have discussed she likely has a component of pelvic floor dysfunction.  I am going to refer her to pelvic floor therapy for further evaluation.  She is also had increased reflux with heartburn.  She has dysphagia to solids.  She is on Nexium 20 mg daily.  Her last EGD was in 2015 with reflux and gastritis.  She agrees to repeat EGD with dilation.  She is concerned about small intestinal bacterial overgrowth, we will biopsy her small bowel at the time of her procedure.  I suspect that her symptoms are multifactorial however ultimately her bloating is likely related to her constipation.  She has not lost any weight.  We will follow-up pending her endoscopic results. MB 1/30/2023 Ms. Lam returns to clinic for follow-up of reflux, bloating, constipation and slow transit history of diverticulitis.  She previously underwent upper endoscopy earlier this month which showed nonsevere esophagitis with nonconcerning pathology.  2 cm hiatal hernia, nonobstructing Schatzki ring dilated with an 18-19-20 mm balloon dilator.  Diffuse mild inflammation was found in the gastric antrum.  Large nonbleeding diverticulum was found in the second portion of the duodenum. Continues on Nexium 40 mg daily, had an episode 2 weeks ago with really bad reflux stating the pain was so bad it was burning into her back and gums giving her ulcers in her mouth.  This dissipated on its own.  She does endorse that she could have had some type of viral illness.  She continues on Motegrity 2 mg daily with MiraLAX and no Colace.  She states at this point she has no urge to defecate and will be presenting for evaluation with pelvic floor therapy in March.  She started new probiotic that her daughter-in-law recommended which is herbal from [employer] and it's helping. She is seeing vascular soon due to her significant family history.  Has not tried FD guard but is willing to start.  SP 4/12/2023 Carole presents for follow-up of abdominal pain and constipation.  Since her last visit she continues to struggle with epigastric abdominal pain that begins at 5 PM every day and last until 7 or 8 or until she gets to bed.  Having a bowel movement does seem to improve the symptoms.  She has a bowel movement most days on the Motegrity but reports incomplete evacuation.  Her EGD in January of this year shows mild gastritis.  Her colonoscopy in 2020 is normal.  Her last contrasted CT scan was in 2019 with acute diverticulitis.  She status post cholecystectomy.  She has tried multiple treatment modalities for the symptoms including Xifaxan and amitriptyline in the past with no relief.  This pain has been going on for years at this point.  Today she is tearful about her symptoms.  She does agree to a trial of BuSpar twice daily for functional dyspepsia.  She feels that the Motegrity is too expensive and does not work well, she would like to give Ibsrela 50 mg twice daily a try, she does agree to consider lactulose if this is no help.  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  She is scheduled to see the pelvic floor therapist this month, her sits marker study does show likely pelvic floor dysfunction.  Today she has multiple GI complaints and is tearful.  MB

## 2023-04-19 ENCOUNTER — TELEPHONE ENCOUNTER (OUTPATIENT)
Dept: URBAN - METROPOLITAN AREA CLINIC 35 | Facility: CLINIC | Age: 73
End: 2023-04-19

## 2023-05-29 ENCOUNTER — ERX REFILL RESPONSE (OUTPATIENT)
Dept: URBAN - NONMETROPOLITAN AREA CLINIC 2 | Facility: CLINIC | Age: 73
End: 2023-05-29

## 2023-05-29 RX ORDER — ESOMEPRAZOLE MAGNESIUM 40 MG/1
1 CAPSULE CAPSULE, DELAYED RELEASE ORAL ONCE A DAY
Qty: 90 | Refills: 3 | OUTPATIENT

## 2023-07-11 ENCOUNTER — OFFICE VISIT (OUTPATIENT)
Dept: URBAN - NONMETROPOLITAN AREA CLINIC 2 | Facility: CLINIC | Age: 73
End: 2023-07-11
Payer: MEDICARE

## 2023-07-11 VITALS
HEIGHT: 67 IN | HEART RATE: 80 BPM | WEIGHT: 203 LBS | BODY MASS INDEX: 31.86 KG/M2 | SYSTOLIC BLOOD PRESSURE: 158 MMHG | DIASTOLIC BLOOD PRESSURE: 100 MMHG | TEMPERATURE: 97.1 F

## 2023-07-11 DIAGNOSIS — K59.04 CHRONIC IDIOPATHIC CONSTIPATION: ICD-10-CM

## 2023-07-11 DIAGNOSIS — K21.9 GERD: ICD-10-CM

## 2023-07-11 DIAGNOSIS — K57.10 DUODENAL DIVERTICULUM: ICD-10-CM

## 2023-07-11 DIAGNOSIS — Z86.010 PERSONAL HISTORY OF COLONIC POLYPS: ICD-10-CM

## 2023-07-11 PROCEDURE — 99214 OFFICE O/P EST MOD 30 MIN: CPT | Performed by: INTERNAL MEDICINE

## 2023-07-11 RX ORDER — ZOLPIDEM TARTRATE 10 MG/1
1 TABLET AT BEDTIME AS NEEDED TABLET, FILM COATED ORAL ONCE A DAY
Status: ACTIVE | COMMUNITY

## 2023-07-11 RX ORDER — HYDROCHLOROTHIAZIDE 25 MG/1
1 TABLET IN THE MORNING TABLET ORAL ONCE A DAY
Status: DISCONTINUED | COMMUNITY

## 2023-07-11 RX ORDER — ZINC GLUCONATE 50 MG
1 TABLET TABLET ORAL ONCE A DAY
Status: ACTIVE | COMMUNITY

## 2023-07-11 RX ORDER — PRUCALOPRIDE 2 MG/1
1 TABLET TABLET, FILM COATED ORAL ONCE A DAY
Qty: 90 TABLET | Refills: 3 | Status: DISCONTINUED | COMMUNITY
Start: 2022-07-14 | End: 2023-08-17

## 2023-07-11 RX ORDER — TENAPANOR HYDROCHLORIDE 53.2 MG/1
1 TABLET IMMEDIATELY BEFORE MEALS TABLET ORAL TWICE A DAY
Qty: 60 TABLET | Refills: 11 | Status: ACTIVE | COMMUNITY
Start: 2023-04-12 | End: 2024-04-06

## 2023-07-11 RX ORDER — CYCLOBENZAPRINE HYDROCHLORIDE 10 MG/1
1 TABLET AT BEDTIME AS NEEDED TABLET, FILM COATED ORAL ONCE A DAY
Status: ACTIVE | COMMUNITY

## 2023-07-11 RX ORDER — BUSPIRONE HYDROCHLORIDE 10 MG/1
1 TABLET TABLET ORAL TWICE A DAY
Qty: 180 TABLET | Refills: 3 | Status: ACTIVE | COMMUNITY
Start: 2023-04-12

## 2023-07-11 RX ORDER — LEVOTHYROXINE SODIUM 75 UG/1
1 TABLET IN THE MORNING ON AN EMPTY STOMACH TABLET ORAL ONCE A DAY
Status: ACTIVE | COMMUNITY

## 2023-07-11 RX ORDER — ESOMEPRAZOLE MAGNESIUM 40 MG/1
1 CAPSULE CAPSULE, DELAYED RELEASE ORAL ONCE A DAY
Qty: 90 | Refills: 3 | Status: ACTIVE | COMMUNITY

## 2023-07-11 RX ORDER — ROSUVASTATIN CALCIUM 20 MG/1
1 TABLET TABLET, FILM COATED ORAL ONCE A DAY
Status: ACTIVE | COMMUNITY

## 2023-07-11 RX ORDER — VALSARTAN 160 MG/1
1 TABLET TABLET, FILM COATED ORAL ONCE A DAY
Status: ACTIVE | COMMUNITY

## 2023-07-11 RX ORDER — BUPROPION HYDROCHLORIDE 150 MG/1
1 TABLET IN THE MORNING TABLET, EXTENDED RELEASE ORAL ONCE A DAY
Status: ACTIVE | COMMUNITY

## 2023-07-11 RX ORDER — SEMAGLUTIDE 1.34 MG/ML
AS DIRECTED INJECTION, SOLUTION SUBCUTANEOUS
Status: ACTIVE | COMMUNITY

## 2023-07-11 NOTE — HPI-TODAY'S VISIT:
8/20 Mrs. Carole Garcia is a 69-year-old female who presents via telephone/telehealth visit today for abdominal pain.  In the past she has followed with Dr. Polanco and Jazmyn.  She has history of diverticulitis.  Until 3 weeks ago, she was in her usual state of health and feeling well.  About that time she started to notice some abdominal bloating and distention with change in her bowel habits including smaller stools and less frequent stools.  She has been on Colace at bedtime for quite a while which has kept her regular.  She also takes Metamucil.  About a week ago she began to have various twinges of abdominal pain in the right lower quadrant and left upper quadrant.  She is concerned she may be developing diverticulitis.  No fever or chills.  The right lower quadrant pain resolved however she continues with the left mid to upper discomfort.  No tenderness when she pushes on this area. She has a CTA abdomen scheduled for Monday with her vascular surgeon.  She last had colonoscopy 1/2020 with a hyperplastic polyp in the ascending colon and diverticulosis of the sigmoid and descending colon-recommended to repeat in 5 years for personal history of polyps.  TG  9/10/2020 Ms. Garcia presents for follow up of diverticulitis. Since her last visit she is s/p flagyl (she is allergic to fluroquinlones). The pain has improved and yesterday she had a normal BM. At this point she is off AMT, she is on colace and mag citrate as needed. She is trying to take metamucil but can't tolerate the powder. She is taking her nexium 20mg daily. Her chronic abdominal pain has improved somewhat. Today she is doing better. MB  9/30/20  Ms Garcia returns to discuss possible diverticulitis flare. she reports worsening constipation in recent days despite metamucil and colace. She started having some lower midline pain and low grade temp. She has felt better in the last 24 hours, but still doesn't feel well. Her son's father is terminally ill and she will be having to go to [place] to see him. no additional complaints. SB   11/11/2020 Ms. Garcia presents for follow up of diverticulosis and GERD. Since her last visit she is doing better. She has been diagnosed with sleep apnea and placed on a CPAP. She did not take the bactrim as she is allergic to slufa drugs as well. She did take the flagyl for 14 days with relief. Her pain improved and at baseline. Her reflux is stable on nexium daily. Today she is doing well. MB  3/9/2021 Carole presents for follow-up of reflux, diverticulosis, history of diverticulitis, and constipation.  Since her last visit she called several weeks ago with worsening left lower quadrant abdominal pain.  She has a history of diverticulitis documented on CT in the sigmoid in 2019.  Her colonoscopy in January 2020 reveals diverticulosis.  She completed the Flagyl as this is the only antibiotic that she can tolerate and her pain improved but she continues to struggle with incomplete evacuation and spasm and constipation.  She is on MiraLAX daily, Colace, and milk of magnesia with no relief.  She has tried Linzess and Amitiza in the past with no relief.  She is also taken lactulose in the distant past with no relief.  Today she is frustrated with her symptoms and her constipation.  MB  4/27/2021 Mrs. Garcia presents for follow-up of reflux, constipation, and diverticulitis.  Since her last visit she was doing great on Motegrity however this was astronomically expensive for her.  She is now on 2 Colace and 1 Senokot at night with a good bowel movement daily.  She struggles tolerating fiber but has tried to increase her fiber in her diet.  She states her reflux stable Nexium daily.  Today she is doing well with no new GI complaints.  MB 7/14/2022 Carole presents for follow-up of constipation.  Since her last visit she continues to struggle with evacuation.  She has tenesmus and passes mucus at times.  We have failed an appeal with [employer] however this worked tremendously better in the past she would like to reappeal.  Today we have discussed evaluation for pelvic floor dysfunction given her surgical history.  We will check a sits marker study and consider the pelvic floor therapy pending results.  Her reflux is stable.  Her last colonoscopy was done in 2020 with 1 polyp.  She denies any flares of diverticulitis.  Today she is doing well other than her constipation.  MB 11/14/2022 Carole presents for follow-up of reflux, bloating, constipation with slow transit, and history of diverticulitis.  Since her last visit we did appeal for Motegrity and this was approved.  This is no longer working with the same result.  She continues to go days without a bowel movement and then has to use a Dulcolax suppository for relief.  She never has the urge to have a bowel movement.  Her colonoscopy in 2020 is normal other than diverticular disease.  She has a history of prolapse repair, we have discussed she likely has a component of pelvic floor dysfunction.  I am going to refer her to pelvic floor therapy for further evaluation.  She is also had increased reflux with heartburn.  She has dysphagia to solids.  She is on Nexium 20 mg daily.  Her last EGD was in 2015 with reflux and gastritis.  She agrees to repeat EGD with dilation.  She is concerned about small intestinal bacterial overgrowth, we will biopsy her small bowel at the time of her procedure.  I suspect that her symptoms are multifactorial however ultimately her bloating is likely related to her constipation.  She has not lost any weight.  We will follow-up pending her endoscopic results. MB 1/30/2023 Ms. Lam returns to clinic for follow-up of reflux, bloating, constipation and slow transit history of diverticulitis.  She previously underwent upper endoscopy earlier this month which showed nonsevere esophagitis with nonconcerning pathology.  2 cm hiatal hernia, nonobstructing Schatzki ring dilated with an 18-19-20 mm balloon dilator.  Diffuse mild inflammation was found in the gastric antrum.  Large nonbleeding diverticulum was found in the second portion of the duodenum. Continues on Nexium 40 mg daily, had an episode 2 weeks ago with really bad reflux stating the pain was so bad it was burning into her back and gums giving her ulcers in her mouth.  This dissipated on its own.  She does endorse that she could have had some type of viral illness.  She continues on Motegrity 2 mg daily with MiraLAX and no Colace.  She states at this point she has no urge to defecate and will be presenting for evaluation with pelvic floor therapy in March.  She started new probiotic that her daughter-in-law recommended which is herbal from [employer] and it's helping. She is seeing vascular soon due to her significant family history.  Has not tried FD guard but is willing to start.  SP 4/12/2023 Carole presents for follow-up of abdominal pain and constipation.  Since her last visit she continues to struggle with epigastric abdominal pain that begins at 5 PM every day and last until 7 or 8 or until she gets to bed.  Having a bowel movement does seem to improve the symptoms.  She has a bowel movement most days on the Motegrity but reports incomplete evacuation.  Her EGD in January of this year shows mild gastritis.  Her colonoscopy in 2020 is normal.  Her last contrasted CT scan was in 2019 with acute diverticulitis.  She status post cholecystectomy.  She has tried multiple treatment modalities for the symptoms including Xifaxan and amitriptyline in the past with no relief.  This pain has been going on for years at this point.  Today she is tearful about her symptoms.  She does agree to a trial of BuSpar twice daily for functional dyspepsia.  She feels that the Motegrity is too expensive and does not work well, she would like to give Ibsrela 50 mg twice daily a try, she does agree to consider lactulose if this is no help.  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  She is scheduled to see the pelvic floor therapist this month, her sits marker study does show likely pelvic floor dysfunction.  Today she has multiple GI complaints and is tearful.  MB 7/11/2023 The patient presents today for follow-up of her abdominal pain and constipation.  Since her last visit, she continues to struggle with her bowels.  She was started on is Isael, but she has only been able to take this once a day.  She is concerned if she takes it twice a day, that she will not have any control of her bowels.  She has been attending pelvic floor therapy, but she is unsure if this is helped significantly.  She feels that she does not get evacuation of her bowels.  She does use Dulcolax suppositories, and this does seem to help somewhat.  Jazmyn also started her on BuSpar at her last visit.  She was having significant symptoms of gas and bloating.  She has been started on Ozempic, and we did discuss that this is likely contributing to some of her symptoms.  She does continue to have symptoms of reflux.  Her main issue is her bowels.  She is tearful as to how her bowels control her life.  It is affecting her relationship with her  and her ability to proceed with her day-to-day activities.  Today, we have discussed the goal of trying to have fairly regular bowel movements without accidents.  I do want her to go back on Metamucil.  After discussing all of the medication she has tried in the past, it does seem that likely Linzess has worked the best.  I am going to give her samples of the lowest dose of Linzess.  I am also going to give her samples of the highest dose.  I have explained to her that we need to find out what dose will work the best for her.  She is going to send us a message on the portal as to which one we need to order.  She is going to take Linzess in the morning with Metamucil in the evening.  She will follow-up with Jazmyn in 3 to 4 months for further evaluation.

## 2023-08-08 ENCOUNTER — WEB ENCOUNTER (OUTPATIENT)
Dept: URBAN - NONMETROPOLITAN AREA CLINIC 2 | Facility: CLINIC | Age: 73
End: 2023-08-08

## 2023-08-08 RX ORDER — LINACLOTIDE 290 UG/1
1 CAPSULE AT LEAST 30 MINUTES BEFORE A MEAL ON AN EMPTY STOMACH CAPSULE, GELATIN COATED ORAL ONCE A DAY
Qty: 90 CAPSULE | Refills: 3 | OUTPATIENT
Start: 2023-08-09 | End: 2024-08-03

## 2023-09-12 ENCOUNTER — OFFICE VISIT (OUTPATIENT)
Dept: URBAN - NONMETROPOLITAN AREA CLINIC 2 | Facility: CLINIC | Age: 73
End: 2023-09-12
Payer: MEDICARE

## 2023-09-12 VITALS
SYSTOLIC BLOOD PRESSURE: 157 MMHG | HEIGHT: 67 IN | HEART RATE: 83 BPM | BODY MASS INDEX: 30.76 KG/M2 | DIASTOLIC BLOOD PRESSURE: 97 MMHG | TEMPERATURE: 98.7 F | WEIGHT: 196 LBS

## 2023-09-12 DIAGNOSIS — K21.9 GERD: ICD-10-CM

## 2023-09-12 DIAGNOSIS — K59.04 CHRONIC IDIOPATHIC CONSTIPATION: ICD-10-CM

## 2023-09-12 PROBLEM — 82934008: Status: ACTIVE | Noted: 2022-07-14

## 2023-09-12 PROBLEM — 235595009 GASTROESOPHAGEAL REFLUX DISEASE: Status: ACTIVE | Noted: 2020-09-10

## 2023-09-12 PROBLEM — 79922009: Status: ACTIVE | Noted: 2023-04-12

## 2023-09-12 PROCEDURE — 99214 OFFICE O/P EST MOD 30 MIN: CPT | Performed by: NURSE PRACTITIONER

## 2023-09-12 RX ORDER — CYCLOBENZAPRINE HYDROCHLORIDE 10 MG/1
1 TABLET AT BEDTIME AS NEEDED TABLET, FILM COATED ORAL ONCE A DAY
Status: ACTIVE | COMMUNITY

## 2023-09-12 RX ORDER — PRUCALOPRIDE 2 MG/1
1 TABLET TABLET, FILM COATED ORAL ONCE A DAY
Qty: 90 TABLET | Refills: 3 | OUTPATIENT
Start: 2023-09-12 | End: 2024-09-06

## 2023-09-12 RX ORDER — LINACLOTIDE 290 UG/1
1 CAPSULE AT LEAST 30 MINUTES BEFORE A MEAL ON AN EMPTY STOMACH CAPSULE, GELATIN COATED ORAL ONCE A DAY
Qty: 90 CAPSULE | Refills: 3 | Status: ACTIVE | COMMUNITY
Start: 2023-08-09 | End: 2024-08-03

## 2023-09-12 RX ORDER — LEVOTHYROXINE SODIUM 75 UG/1
1 TABLET IN THE MORNING ON AN EMPTY STOMACH TABLET ORAL ONCE A DAY
Status: ACTIVE | COMMUNITY

## 2023-09-12 RX ORDER — ZINC GLUCONATE 50 MG
1 TABLET TABLET ORAL ONCE A DAY
Status: ACTIVE | COMMUNITY

## 2023-09-12 RX ORDER — TENAPANOR HYDROCHLORIDE 53.2 MG/1
1 TABLET IMMEDIATELY BEFORE MEALS TABLET ORAL TWICE A DAY
Qty: 60 TABLET | Refills: 11 | Status: ACTIVE | COMMUNITY
Start: 2023-04-12 | End: 2024-04-06

## 2023-09-12 RX ORDER — VALSARTAN 160 MG/1
1 TABLET TABLET, FILM COATED ORAL ONCE A DAY
Status: ACTIVE | COMMUNITY

## 2023-09-12 RX ORDER — ZOLPIDEM TARTRATE 10 MG/1
1 TABLET AT BEDTIME AS NEEDED TABLET, FILM COATED ORAL ONCE A DAY
Status: ACTIVE | COMMUNITY

## 2023-09-12 RX ORDER — SEMAGLUTIDE 1.34 MG/ML
AS DIRECTED INJECTION, SOLUTION SUBCUTANEOUS
Status: ACTIVE | COMMUNITY

## 2023-09-12 RX ORDER — BUPROPION HYDROCHLORIDE 150 MG/1
1 TABLET IN THE MORNING TABLET, EXTENDED RELEASE ORAL ONCE A DAY
Status: ACTIVE | COMMUNITY

## 2023-09-12 RX ORDER — ESOMEPRAZOLE MAGNESIUM 40 MG/1
1 CAPSULE CAPSULE, DELAYED RELEASE ORAL ONCE A DAY
Qty: 90 | Refills: 3 | Status: ACTIVE | COMMUNITY

## 2023-09-12 RX ORDER — ROSUVASTATIN CALCIUM 20 MG/1
1 TABLET TABLET, FILM COATED ORAL ONCE A DAY
Status: ACTIVE | COMMUNITY

## 2023-09-12 RX ORDER — BUSPIRONE HYDROCHLORIDE 10 MG/1
1 TABLET TABLET ORAL TWICE A DAY
Qty: 180 TABLET | Refills: 3 | Status: ACTIVE | COMMUNITY
Start: 2023-04-12

## 2023-09-12 NOTE — HPI-TODAY'S VISIT:
8/20 Mrs. Carole Garcia is a 69-year-old female who presents via telephone/telehealth visit today for abdominal pain.  In the past she has followed with Dr. Polanco and Jazmyn.  She has history of diverticulitis.  Until 3 weeks ago, she was in her usual state of health and feeling well.  About that time she started to notice some abdominal bloating and distention with change in her bowel habits including smaller stools and less frequent stools.  She has been on Colace at bedtime for quite a while which has kept her regular.  She also takes Metamucil.  About a week ago she began to have various twinges of abdominal pain in the right lower quadrant and left upper quadrant.  She is concerned she may be developing diverticulitis.  No fever or chills.  The right lower quadrant pain resolved however she continues with the left mid to upper discomfort.  No tenderness when she pushes on this area. She has a CTA abdomen scheduled for Monday with her vascular surgeon.  She last had colonoscopy 1/2020 with a hyperplastic polyp in the ascending colon and diverticulosis of the sigmoid and descending colon-recommended to repeat in 5 years for personal history of polyps.  TG  9/10/2020 Ms. Garcia presents for follow up of diverticulitis. Since her last visit she is s/p flagyl (she is allergic to fluroquinlones). The pain has improved and yesterday she had a normal BM. At this point she is off AMT, she is on colace and mag citrate as needed. She is trying to take metamucil but can't tolerate the powder. She is taking her nexium 20mg daily. Her chronic abdominal pain has improved somewhat. Today she is doing better. MB  9/30/20  Ms Garcia returns to discuss possible diverticulitis flare. she reports worsening constipation in recent days despite metamucil and colace. She started having some lower midline pain and low grade temp. She has felt better in the last 24 hours, but still doesn't feel well. Her son's father is terminally ill and she will be having to go to [place] to see him. no additional complaints. SB   11/11/2020 Ms. Garcia presents for follow up of diverticulosis and GERD. Since her last visit she is doing better. She has been diagnosed with sleep apnea and placed on a CPAP. She did not take the bactrim as she is allergic to slufa drugs as well. She did take the flagyl for 14 days with relief. Her pain improved and at baseline. Her reflux is stable on nexium daily. Today she is doing well. MB  3/9/2021 Carole presents for follow-up of reflux, diverticulosis, history of diverticulitis, and constipation.  Since her last visit she called several weeks ago with worsening left lower quadrant abdominal pain.  She has a history of diverticulitis documented on CT in the sigmoid in 2019.  Her colonoscopy in January 2020 reveals diverticulosis.  She completed the Flagyl as this is the only antibiotic that she can tolerate and her pain improved but she continues to struggle with incomplete evacuation and spasm and constipation.  She is on MiraLAX daily, Colace, and milk of magnesia with no relief.  She has tried Linzess and Amitiza in the past with no relief.  She is also taken lactulose in the distant past with no relief.  Today she is frustrated with her symptoms and her constipation.  MB  4/27/2021 Mrs. Garcia presents for follow-up of reflux, constipation, and diverticulitis.  Since her last visit she was doing great on Motegrity however this was astronomically expensive for her.  She is now on 2 Colace and 1 Senokot at night with a good bowel movement daily.  She struggles tolerating fiber but has tried to increase her fiber in her diet.  She states her reflux stable Nexium daily.  Today she is doing well with no new GI complaints.  MB 7/14/2022 Carole presents for follow-up of constipation.  Since her last visit she continues to struggle with evacuation.  She has tenesmus and passes mucus at times.  We have failed an appeal with [employer] however this worked tremendously better in the past she would like to reappeal.  Today we have discussed evaluation for pelvic floor dysfunction given her surgical history.  We will check a sits marker study and consider the pelvic floor therapy pending results.  Her reflux is stable.  Her last colonoscopy was done in 2020 with 1 polyp.  She denies any flares of diverticulitis.  Today she is doing well other than her constipation.  MB 11/14/2022 Carole presents for follow-up of reflux, bloating, constipation with slow transit, and history of diverticulitis.  Since her last visit we did appeal for Motegrity and this was approved.  This is no longer working with the same result.  She continues to go days without a bowel movement and then has to use a Dulcolax suppository for relief.  She never has the urge to have a bowel movement.  Her colonoscopy in 2020 is normal other than diverticular disease.  She has a history of prolapse repair, we have discussed she likely has a component of pelvic floor dysfunction.  I am going to refer her to pelvic floor therapy for further evaluation.  She is also had increased reflux with heartburn.  She has dysphagia to solids.  She is on Nexium 20 mg daily.  Her last EGD was in 2015 with reflux and gastritis.  She agrees to repeat EGD with dilation.  She is concerned about small intestinal bacterial overgrowth, we will biopsy her small bowel at the time of her procedure.  I suspect that her symptoms are multifactorial however ultimately her bloating is likely related to her constipation.  She has not lost any weight.  We will follow-up pending her endoscopic results. MB 1/30/2023 Ms. Lam returns to clinic for follow-up of reflux, bloating, constipation and slow transit history of diverticulitis.  She previously underwent upper endoscopy earlier this month which showed nonsevere esophagitis with nonconcerning pathology.  2 cm hiatal hernia, nonobstructing Schatzki ring dilated with an 18-19-20 mm balloon dilator.  Diffuse mild inflammation was found in the gastric antrum.  Large nonbleeding diverticulum was found in the second portion of the duodenum. Continues on Nexium 40 mg daily, had an episode 2 weeks ago with really bad reflux stating the pain was so bad it was burning into her back and gums giving her ulcers in her mouth.  This dissipated on its own.  She does endorse that she could have had some type of viral illness.  She continues on Motegrity 2 mg daily with MiraLAX and no Colace.  She states at this point she has no urge to defecate and will be presenting for evaluation with pelvic floor therapy in March.  She started new probiotic that her daughter-in-law recommended which is herbal from [employer] and it's helping. She is seeing vascular soon due to her significant family history.  Has not tried FD guard but is willing to start.  SP 4/12/2023 Carole presents for follow-up of abdominal pain and constipation.  Since her last visit she continues to struggle with epigastric abdominal pain that begins at 5 PM every day and last until 7 or 8 or until she gets to bed.  Having a bowel movement does seem to improve the symptoms.  She has a bowel movement most days on the Motegrity but reports incomplete evacuation.  Her EGD in January of this year shows mild gastritis.  Her colonoscopy in 2020 is normal.  Her last contrasted CT scan was in 2019 with acute diverticulitis.  She status post cholecystectomy.  She has tried multiple treatment modalities for the symptoms including Xifaxan and amitriptyline in the past with no relief.  This pain has been going on for years at this point.  Today she is tearful about her symptoms.  She does agree to a trial of BuSpar twice daily for functional dyspepsia.  She feels that the Motegrity is too expensive and does not work well, she would like to give Ibsrela 50 mg twice daily a try, she does agree to consider lactulose if this is no help.  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  She is scheduled to see the pelvic floor therapist this month, her sits marker study does show likely pelvic floor dysfunction.  Today she has multiple GI complaints and is tearful.  MB 7/11/2023 The patient presents today for follow-up of her abdominal pain and constipation.  Since her last visit, she continues to struggle with her bowels.  She was started on is Isael, but she has only been able to take this once a day.  She is concerned if she takes it twice a day, that she will not have any control of her bowels.  She has been attending pelvic floor therapy, but she is unsure if this is helped significantly.  She feels that she does not get evacuation of her bowels.  She does use Dulcolax suppositories, and this does seem to help somewhat.  Jazmyn also started her on BuSpar at her last visit.  She was having significant symptoms of gas and bloating.  She has been started on Ozempic, and we did discuss that this is likely contributing to some of her symptoms.  She does continue to have symptoms of reflux.  Her main issue is her bowels.  She is tearful as to how her bowels control her life.  It is affecting her relationship with her  and her ability to proceed with her day-to-day activities.  Today, we have discussed the goal of trying to have fairly regular bowel movements without accidents.  I do want her to go back on Metamucil.  After discussing all of the medication she has tried in the past, it does seem that likely Linzess has worked the best.  I am going to give her samples of the lowest dose of Linzess.  I am also going to give her samples of the highest dose.  I have explained to her that we need to find out what dose will work the best for her.  She is going to send us a message on the portal as to which one we need to order.  She is going to take Linzess in the morning with Metamucil in the evening.  She will follow-up with Jazmyn in 3 to 4 months for further evaluation. 9/12/2023 Carole presents for follow-up of reflux, constipation, and history of diverticulitis.  Since her last visit she continues to work with pelvic floor therapy.  She is taking the Linzess 290 mcg most days a week however she does experience lack of emptying with a sense of urgency and then explosive diarrhea.  In reviewing all the medication she is taken in the past, Motegrity seem to work the best.  We will repeat a trial of this.  We have discussed MRI defecography and anorectal manometry.  Unfortunately regardless of these test she has failed all medical therapy.  Her pelvic floor therapist has recommended or mentioned biofeedback.  She is going to discuss that further with her pelvic floor therapy team, okay to refer for anorectal manometry if necessary.  Her last colonoscopy was in 2020 with diverticular disease.  Her reflux is stable on as omeprazole 40 mg daily.  Today she continues to struggle with her GI complaints.  MB

## 2023-09-18 ENCOUNTER — WEB ENCOUNTER (OUTPATIENT)
Dept: URBAN - NONMETROPOLITAN AREA CLINIC 2 | Facility: CLINIC | Age: 73
End: 2023-09-18

## 2023-09-18 RX ORDER — HYOSCYAMINE SULFATE 0.12 MG/1
1 TABLET AS NEEDED TABLET ORAL
Qty: 60 TABLET | Refills: 11 | OUTPATIENT
Start: 2023-09-19 | End: 2024-09-13

## 2023-09-18 RX ORDER — PRUCALOPRIDE 2 MG/1
1 TABLET TABLET, FILM COATED ORAL ONCE A DAY
Qty: 90 TABLET | Refills: 3
Start: 2023-09-12 | End: 2024-09-13

## 2023-11-08 ENCOUNTER — WEB ENCOUNTER (OUTPATIENT)
Dept: URBAN - NONMETROPOLITAN AREA CLINIC 2 | Facility: CLINIC | Age: 73
End: 2023-11-08

## 2023-11-08 RX ORDER — ESOMEPRAZOLE MAGNESIUM 40 MG/1
1 CAPSULE CAPSULE, DELAYED RELEASE ORAL ONCE A DAY
Qty: 90 | Refills: 3

## 2023-12-06 ENCOUNTER — TELEPHONE ENCOUNTER (OUTPATIENT)
Dept: URBAN - NONMETROPOLITAN AREA CLINIC 13 | Facility: CLINIC | Age: 73
End: 2023-12-06

## 2024-01-22 ENCOUNTER — WEB ENCOUNTER (OUTPATIENT)
Dept: URBAN - NONMETROPOLITAN AREA CLINIC 2 | Facility: CLINIC | Age: 74
End: 2024-01-22

## 2024-02-12 ENCOUNTER — LAB (OUTPATIENT)
Dept: URBAN - NONMETROPOLITAN AREA CLINIC 2 | Facility: CLINIC | Age: 74
End: 2024-02-12

## 2024-03-14 ENCOUNTER — COLON (OUTPATIENT)
Dept: URBAN - METROPOLITAN AREA MEDICAL CENTER 1 | Facility: MEDICAL CENTER | Age: 74
End: 2024-03-14
Payer: MEDICARE

## 2024-03-14 DIAGNOSIS — Z09 ENCOUNTER FOR COLONOSCOPY FOLLOWING COLON POLYP REMOVAL: ICD-10-CM

## 2024-03-14 DIAGNOSIS — Z86.010 ADENOMAS PERSONAL HISTORY OF COLONIC POLYPS: ICD-10-CM

## 2024-03-14 PROCEDURE — G0105 COLORECTAL SCRN; HI RISK IND: HCPCS | Performed by: INTERNAL MEDICINE

## 2024-03-14 RX ORDER — HYOSCYAMINE SULFATE 0.12 MG/1
1 TABLET AS NEEDED TABLET ORAL
Qty: 60 TABLET | Refills: 11 | Status: ACTIVE | COMMUNITY
Start: 2023-09-19 | End: 2024-09-13

## 2024-03-14 RX ORDER — VALSARTAN 160 MG/1
1 TABLET TABLET, FILM COATED ORAL ONCE A DAY
Status: ACTIVE | COMMUNITY

## 2024-03-14 RX ORDER — CYCLOBENZAPRINE HYDROCHLORIDE 10 MG/1
1 TABLET AT BEDTIME AS NEEDED TABLET, FILM COATED ORAL ONCE A DAY
Status: ACTIVE | COMMUNITY

## 2024-03-14 RX ORDER — BUPROPION HYDROCHLORIDE 150 MG/1
1 TABLET IN THE MORNING TABLET, EXTENDED RELEASE ORAL ONCE A DAY
Status: ACTIVE | COMMUNITY

## 2024-03-14 RX ORDER — ZOLPIDEM TARTRATE 10 MG/1
1 TABLET AT BEDTIME AS NEEDED TABLET, FILM COATED ORAL ONCE A DAY
Status: ACTIVE | COMMUNITY

## 2024-03-14 RX ORDER — ZINC GLUCONATE 50 MG
1 TABLET TABLET ORAL ONCE A DAY
Status: ACTIVE | COMMUNITY

## 2024-03-14 RX ORDER — BUSPIRONE HYDROCHLORIDE 10 MG/1
1 TABLET TABLET ORAL TWICE A DAY
Qty: 180 TABLET | Refills: 3 | Status: ACTIVE | COMMUNITY
Start: 2023-04-12

## 2024-03-14 RX ORDER — SEMAGLUTIDE 1.34 MG/ML
AS DIRECTED INJECTION, SOLUTION SUBCUTANEOUS
Status: ACTIVE | COMMUNITY

## 2024-03-14 RX ORDER — LEVOTHYROXINE SODIUM 75 UG/1
1 TABLET IN THE MORNING ON AN EMPTY STOMACH TABLET ORAL ONCE A DAY
Status: ACTIVE | COMMUNITY

## 2024-03-14 RX ORDER — ESOMEPRAZOLE MAGNESIUM 40 MG/1
1 CAPSULE CAPSULE, DELAYED RELEASE ORAL ONCE A DAY
Qty: 90 | Refills: 3 | Status: ACTIVE | COMMUNITY

## 2024-03-14 RX ORDER — PRUCALOPRIDE 2 MG/1
1 TABLET TABLET, FILM COATED ORAL ONCE A DAY
Qty: 90 TABLET | Refills: 3 | Status: ACTIVE | COMMUNITY
Start: 2023-09-12 | End: 2024-09-13

## 2024-03-14 RX ORDER — LINACLOTIDE 290 UG/1
1 CAPSULE AT LEAST 30 MINUTES BEFORE A MEAL ON AN EMPTY STOMACH CAPSULE, GELATIN COATED ORAL ONCE A DAY
Qty: 90 CAPSULE | Refills: 3 | Status: ACTIVE | COMMUNITY
Start: 2023-08-09 | End: 2024-08-03

## 2024-03-14 RX ORDER — ROSUVASTATIN CALCIUM 20 MG/1
1 TABLET TABLET, FILM COATED ORAL ONCE A DAY
Status: ACTIVE | COMMUNITY

## 2024-03-14 RX ORDER — TENAPANOR HYDROCHLORIDE 53.2 MG/1
1 TABLET IMMEDIATELY BEFORE MEALS TABLET ORAL TWICE A DAY
Qty: 60 TABLET | Refills: 11 | Status: ACTIVE | COMMUNITY
Start: 2023-04-12 | End: 2024-04-06

## 2024-04-11 ENCOUNTER — OV EP (OUTPATIENT)
Dept: URBAN - NONMETROPOLITAN AREA CLINIC 2 | Facility: CLINIC | Age: 74
End: 2024-04-11
Payer: MEDICARE

## 2024-04-11 ENCOUNTER — LAB (OUTPATIENT)
Dept: URBAN - NONMETROPOLITAN AREA CLINIC 2 | Facility: CLINIC | Age: 74
End: 2024-04-11

## 2024-04-11 VITALS
SYSTOLIC BLOOD PRESSURE: 147 MMHG | DIASTOLIC BLOOD PRESSURE: 91 MMHG | HEART RATE: 83 BPM | HEIGHT: 67 IN | TEMPERATURE: 98 F | BODY MASS INDEX: 31.58 KG/M2 | WEIGHT: 201.2 LBS

## 2024-04-11 DIAGNOSIS — Z86.010 PERSONAL HISTORY OF COLONIC POLYPS: ICD-10-CM

## 2024-04-11 DIAGNOSIS — K21.9 GERD: ICD-10-CM

## 2024-04-11 DIAGNOSIS — K59.04 CHRONIC IDIOPATHIC CONSTIPATION: ICD-10-CM

## 2024-04-11 DIAGNOSIS — K57.32 DIVERTICULITIS LARGE INTESTINE W/O PERFORATION OR ABSCESS W/O BLEEDING: ICD-10-CM

## 2024-04-11 PROCEDURE — 99214 OFFICE O/P EST MOD 30 MIN: CPT | Performed by: NURSE PRACTITIONER

## 2024-04-11 RX ORDER — LANSOPRAZOLE 30 MG/1
1 CAPSULE BEFORE A MEAL CAPSULE, DELAYED RELEASE ORAL TWICE DAILY
Qty: 180 CAPSULE | Refills: 3 | OUTPATIENT
Start: 2024-04-11

## 2024-04-11 RX ORDER — LINACLOTIDE 290 UG/1
1 CAPSULE AT LEAST 30 MINUTES BEFORE A MEAL ON AN EMPTY STOMACH CAPSULE, GELATIN COATED ORAL ONCE A DAY
Qty: 90 CAPSULE | Refills: 3 | Status: ACTIVE | COMMUNITY
Start: 2023-08-09 | End: 2024-08-03

## 2024-04-11 RX ORDER — SEMAGLUTIDE 1.34 MG/ML
AS DIRECTED INJECTION, SOLUTION SUBCUTANEOUS
Status: ACTIVE | COMMUNITY

## 2024-04-11 RX ORDER — PRUCALOPRIDE 2 MG/1
1 TABLET TABLET, FILM COATED ORAL ONCE A DAY
Qty: 90 TABLET | Refills: 3 | Status: ACTIVE | COMMUNITY
Start: 2023-09-12 | End: 2024-09-13

## 2024-04-11 RX ORDER — VALSARTAN 160 MG/1
1 TABLET TABLET, FILM COATED ORAL ONCE A DAY
Status: ACTIVE | COMMUNITY

## 2024-04-11 RX ORDER — CYCLOBENZAPRINE HYDROCHLORIDE 10 MG/1
1 TABLET AT BEDTIME AS NEEDED TABLET, FILM COATED ORAL ONCE A DAY
Status: ACTIVE | COMMUNITY

## 2024-04-11 RX ORDER — BUPROPION HYDROCHLORIDE 150 MG/1
1 TABLET IN THE MORNING TABLET, EXTENDED RELEASE ORAL ONCE A DAY
Status: ACTIVE | COMMUNITY

## 2024-04-11 RX ORDER — ZINC GLUCONATE 50 MG
1 TABLET TABLET ORAL ONCE A DAY
Status: ACTIVE | COMMUNITY

## 2024-04-11 RX ORDER — HYOSCYAMINE SULFATE 0.12 MG/1
1 TABLET AS NEEDED TABLET ORAL
Qty: 60 TABLET | Refills: 11 | Status: ACTIVE | COMMUNITY
Start: 2023-09-19 | End: 2024-09-13

## 2024-04-11 RX ORDER — ZOLPIDEM TARTRATE 10 MG/1
1 TABLET AT BEDTIME AS NEEDED TABLET, FILM COATED ORAL ONCE A DAY
Status: ACTIVE | COMMUNITY

## 2024-04-11 RX ORDER — ROSUVASTATIN CALCIUM 20 MG/1
1 TABLET TABLET, FILM COATED ORAL ONCE A DAY
Status: ACTIVE | COMMUNITY

## 2024-04-11 RX ORDER — LEVOTHYROXINE SODIUM 75 UG/1
1 TABLET IN THE MORNING ON AN EMPTY STOMACH TABLET ORAL ONCE A DAY
Status: ACTIVE | COMMUNITY

## 2024-04-11 RX ORDER — BUSPIRONE HYDROCHLORIDE 10 MG/1
1 TABLET TABLET ORAL TWICE A DAY
Qty: 180 TABLET | Refills: 3 | Status: ACTIVE | COMMUNITY
Start: 2023-04-12

## 2024-04-11 RX ORDER — ESOMEPRAZOLE MAGNESIUM 40 MG/1
1 CAPSULE CAPSULE, DELAYED RELEASE ORAL ONCE A DAY
Qty: 90 | Refills: 3 | Status: ACTIVE | COMMUNITY

## 2024-04-11 NOTE — HPI-TODAY'S VISIT:
8/20 Mrs. Carole Garcia is a 69-year-old female who presents via telephone/telehealth visit today for abdominal pain.  In the past she has followed with Dr. Polanco and Jazmyn.  She has history of diverticulitis.  Until 3 weeks ago, she was in her usual state of health and feeling well.  About that time she started to notice some abdominal bloating and distention with change in her bowel habits including smaller stools and less frequent stools.  She has been on Colace at bedtime for quite a while which has kept her regular.  She also takes Metamucil.  About a week ago she began to have various twinges of abdominal pain in the right lower quadrant and left upper quadrant.  She is concerned she may be developing diverticulitis.  No fever or chills.  The right lower quadrant pain resolved however she continues with the left mid to upper discomfort.  No tenderness when she pushes on this area. She has a CTA abdomen scheduled for Monday with her vascular surgeon.  She last had colonoscopy 1/2020 with a hyperplastic polyp in the ascending colon and diverticulosis of the sigmoid and descending colon-recommended to repeat in 5 years for personal history of polyps.  TG  9/10/2020 Ms. Garcia presents for follow up of diverticulitis. Since her last visit she is s/p flagyl (she is allergic to fluroquinlones). The pain has improved and yesterday she had a normal BM. At this point she is off AMT, she is on colace and mag citrate as needed. She is trying to take metamucil but can't tolerate the powder. She is taking her nexium 20mg daily. Her chronic abdominal pain has improved somewhat. Today she is doing better. MB  9/30/20  Ms Garcia returns to discuss possible diverticulitis flare. she reports worsening constipation in recent days despite metamucil and colace. She started having some lower midline pain and low grade temp. She has felt better in the last 24 hours, but still doesn't feel well. Her son's father is terminally ill and she will be having to go to [place] to see him. no additional complaints. SB   11/11/2020 Ms. Garcia presents for follow up of diverticulosis and GERD. Since her last visit she is doing better. She has been diagnosed with sleep apnea and placed on a CPAP. She did not take the bactrim as she is allergic to slufa drugs as well. She did take the flagyl for 14 days with relief. Her pain improved and at baseline. Her reflux is stable on nexium daily. Today she is doing well. MB  3/9/2021 Carole presents for follow-up of reflux, diverticulosis, history of diverticulitis, and constipation.  Since her last visit she called several weeks ago with worsening left lower quadrant abdominal pain.  She has a history of diverticulitis documented on CT in the sigmoid in 2019.  Her colonoscopy in January 2020 reveals diverticulosis.  She completed the Flagyl as this is the only antibiotic that she can tolerate and her pain improved but she continues to struggle with incomplete evacuation and spasm and constipation.  She is on MiraLAX daily, Colace, and milk of magnesia with no relief.  She has tried Linzess and Amitiza in the past with no relief.  She is also taken lactulose in the distant past with no relief.  Today she is frustrated with her symptoms and her constipation.  MB  4/27/2021 Mrs. Garcia presents for follow-up of reflux, constipation, and diverticulitis.  Since her last visit she was doing great on Motegrity however this was astronomically expensive for her.  She is now on 2 Colace and 1 Senokot at night with a good bowel movement daily.  She struggles tolerating fiber but has tried to increase her fiber in her diet.  She states her reflux stable Nexium daily.  Today she is doing well with no new GI complaints.  MB 7/14/2022 Carole presents for follow-up of constipation.  Since her last visit she continues to struggle with evacuation.  She has tenesmus and passes mucus at times.  We have failed an appeal with [email or web address] however this worked tremendously better in the past she would like to reappeal.  Today we have discussed evaluation for pelvic floor dysfunction given her surgical history.  We will check a sits marker study and consider the pelvic floor therapy pending results.  Her reflux is stable.  Her last colonoscopy was done in 2020 with 1 polyp.  She denies any flares of diverticulitis.  Today she is doing well other than her constipation.  MB 11/14/2022 Carole presents for follow-up of reflux, bloating, constipation with slow transit, and history of diverticulitis.  Since her last visit we did appeal for Motegrity and this was approved.  This is no longer working with the same result.  She continues to go days without a bowel movement and then has to use a Dulcolax suppository for relief.  She never has the urge to have a bowel movement.  Her colonoscopy in 2020 is normal other than diverticular disease.  She has a history of prolapse repair, we have discussed she likely has a component of pelvic floor dysfunction.  I am going to refer her to pelvic floor therapy for further evaluation.  She is also had increased reflux with heartburn.  She has dysphagia to solids.  She is on Nexium 20 mg daily.  Her last EGD was in 2015 with reflux and gastritis.  She agrees to repeat EGD with dilation.  She is concerned about small intestinal bacterial overgrowth, we will biopsy her small bowel at the time of her procedure.  I suspect that her symptoms are multifactorial however ultimately her bloating is likely related to her constipation.  She has not lost any weight.  We will follow-up pending her endoscopic results. MB 1/30/2023 Ms. Lam returns to clinic for follow-up of reflux, bloating, constipation and slow transit history of diverticulitis.  She previously underwent upper endoscopy earlier this month which showed nonsevere esophagitis with nonconcerning pathology.  2 cm hiatal hernia, nonobstructing Schatzki ring dilated with an 18-19-20 mm balloon dilator.  Diffuse mild inflammation was found in the gastric antrum.  Large nonbleeding diverticulum was found in the second portion of the duodenum. Continues on Nexium 40 mg daily, had an episode 2 weeks ago with really bad reflux stating the pain was so bad it was burning into her back and gums giving her ulcers in her mouth.  This dissipated on its own.  She does endorse that she could have had some type of viral illness.  She continues on Motegrity 2 mg daily with MiraLAX and no Colace.  She states at this point she has no urge to defecate and will be presenting for evaluation with pelvic floor therapy in March.  She started new probiotic that her daughter-in-law recommended which is herbal from [employer] and it's helping. She is seeing vascular soon due to her significant family history.  Has not tried FD guard but is willing to start.  SP 4/12/2023 Carole presents for follow-up of abdominal pain and constipation.  Since her last visit she continues to struggle with epigastric abdominal pain that begins at 5 PM every day and last until 7 or 8 or until she gets to bed.  Having a bowel movement does seem to improve the symptoms.  She has a bowel movement most days on the Motegrity but reports incomplete evacuation.  Her EGD in January of this year shows mild gastritis.  Her colonoscopy in 2020 is normal.  Her last contrasted CT scan was in 2019 with acute diverticulitis.  She status post cholecystectomy.  She has tried multiple treatment modalities for the symptoms including Xifaxan and amitriptyline in the past with no relief.  This pain has been going on for years at this point.  Today she is tearful about her symptoms.  She does agree to a trial of BuSpar twice daily for functional dyspepsia.  She feels that the Motegrity is too expensive and does not work well, she would like to give Ibsrela 50 mg twice daily a try, she does agree to consider lactulose if this is no help.  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  She is scheduled to see the pelvic floor therapist this month, her sits marker study does show likely pelvic floor dysfunction.  Today she has multiple GI complaints and is tearful.  MB 7/11/2023 The patient presents today for follow-up of her abdominal pain and constipation.  Since her last visit, she continues to struggle with her bowels.  She was started on is Isael, but she has only been able to take this once a day.  She is concerned if she takes it twice a day, that she will not have any control of her bowels.  She has been attending pelvic floor therapy, but she is unsure if this is helped significantly.  She feels that she does not get evacuation of her bowels.  She does use Dulcolax suppositories, and this does seem to help somewhat.  Jazmyn also started her on BuSpar at her last visit.  She was having significant symptoms of gas and bloating.  She has been started on Ozempic, and we did discuss that this is likely contributing to some of her symptoms.  She does continue to have symptoms of reflux.  Her main issue is her bowels.  She is tearful as to how her bowels control her life.  It is affecting her relationship with her  and her ability to proceed with her day-to-day activities.  Today, we have discussed the goal of trying to have fairly regular bowel movements without accidents.  I do want her to go back on Metamucil.  After discussing all of the medication she has tried in the past, it does seem that likely Linzess has worked the best.  I am going to give her samples of the lowest dose of Linzess.  I am also going to give her samples of the highest dose.  I have explained to her that we need to find out what dose will work the best for her.  She is going to send us a message on the portal as to which one we need to order.  She is going to take Linzess in the morning with Metamucil in the evening.  She will follow-up with Jazmyn in 3 to 4 months for further evaluation. 9/12/2023 Carole presents for follow-up of reflux, constipation, and history of diverticulitis.  Since her last visit she continues to work with pelvic floor therapy.  She is taking the Linzess 290 mcg most days a week however she does experience lack of emptying with a sense of urgency and then explosive diarrhea.  In reviewing all the medication she is taken in the past, Motegrity seem to work the best.  We will repeat a trial of this.  We have discussed MRI defecography and anorectal manometry.  Unfortunately regardless of these test she has failed all medical therapy.  Her pelvic floor therapist has recommended or mentioned biofeedback.  She is going to discuss that further with her pelvic floor therapy team, okay to refer for anorectal manometry if necessary.  Her last colonoscopy was in 2020 with diverticular disease.  Her reflux is stable on as omeprazole 40 mg daily.  Today she continues to struggle with her GI complaints.  JATINDER Edmondsita presents for follow-up of colonoscopy, history of recurrent diverticulitis, history of constipation with pelvic floor dysfunction, and reflux.  Since her last visit she had a violent episode of reflux and regurgitation after eating at [employer]  She is on as omeprazole 40 in the morning and famotidine at night.  She agrees her diet could be better.  She agrees to try Dexilant 60 mg daily and lansoprazole 30 mg twice daily if not covered.  She continues to struggle with constipation but is overall doing well on Motegrity 2 mg in the morning, MiraLAX in the morning, 5 capsules of psyllium in the evening, and 1 Senokot.  She uses a glycerin or Dulcolax suppository every 2 to 3 days with no bowel movement.  She had another episode of diverticulitis with questionable mass effect in the sigmoid.  Her colonoscopy in March shows no colonic mass but diverticular disease.  Today we have discussed her diverticulitis recurrence, she has had sigmoid disease in 2017, 2019, 2020, 2023, and this year.  She feels like she never gets over the episodes.  She is still working with pelvic floor therapy.  She still struggles with evacuation.  Today she agrees to pursue anorectal manometry, to see if she is a candidate for biofeedback.  She is using a squatty potty.  She does agree to a surgical consultation with Dr. Ochoa for her pelvic floor dysfunction and also recurrent diverticulitis.  We will increase her PPI as well.  MB

## 2024-05-21 ENCOUNTER — OFFICE VISIT (OUTPATIENT)
Dept: URBAN - METROPOLITAN AREA MEDICAL CENTER 28 | Facility: MEDICAL CENTER | Age: 74
End: 2024-05-21
Payer: MEDICARE

## 2024-05-21 DIAGNOSIS — K59.09 CONSTIPATION: ICD-10-CM

## 2024-05-21 PROCEDURE — 91122 ANAL PRESSURE RECORD: CPT | Performed by: INTERNAL MEDICINE

## 2024-05-21 PROCEDURE — 91120 RECTAL SENSATION TEST: CPT | Performed by: INTERNAL MEDICINE

## 2024-05-23 ENCOUNTER — OFFICE VISIT (OUTPATIENT)
Dept: URBAN - METROPOLITAN AREA MEDICAL CENTER 1 | Facility: MEDICAL CENTER | Age: 74
End: 2024-05-23

## 2024-05-28 ENCOUNTER — TELEPHONE ENCOUNTER (OUTPATIENT)
Dept: URBAN - NONMETROPOLITAN AREA CLINIC 2 | Facility: CLINIC | Age: 74
End: 2024-05-28

## 2024-06-03 ENCOUNTER — OFFICE VISIT (OUTPATIENT)
Dept: URBAN - NONMETROPOLITAN AREA CLINIC 2 | Facility: CLINIC | Age: 74
End: 2024-06-03
Payer: MEDICARE

## 2024-06-03 ENCOUNTER — DASHBOARD ENCOUNTERS (OUTPATIENT)
Age: 74
End: 2024-06-03

## 2024-06-03 VITALS
SYSTOLIC BLOOD PRESSURE: 122 MMHG | BODY MASS INDEX: 31.45 KG/M2 | DIASTOLIC BLOOD PRESSURE: 86 MMHG | WEIGHT: 200.4 LBS | HEIGHT: 67 IN | HEART RATE: 93 BPM | TEMPERATURE: 98 F

## 2024-06-03 DIAGNOSIS — K57.10 DUODENAL DIVERTICULUM: ICD-10-CM

## 2024-06-03 DIAGNOSIS — K57.92 DIVERTICULITIS: ICD-10-CM

## 2024-06-03 DIAGNOSIS — K21.9 GERD: ICD-10-CM

## 2024-06-03 DIAGNOSIS — K59.04 CHRONIC IDIOPATHIC CONSTIPATION: ICD-10-CM

## 2024-06-03 PROCEDURE — 99213 OFFICE O/P EST LOW 20 MIN: CPT

## 2024-06-03 RX ORDER — CYCLOBENZAPRINE HYDROCHLORIDE 10 MG/1
1 TABLET AT BEDTIME AS NEEDED TABLET, FILM COATED ORAL ONCE A DAY
Status: ACTIVE | COMMUNITY

## 2024-06-03 RX ORDER — LINACLOTIDE 72 UG/1
1 CAPSULE AT LEAST 30 MINUTES BEFORE THE FIRST MEAL OF THE DAY ON AN EMPTY STOMACH CAPSULE, GELATIN COATED ORAL ONCE A DAY
Qty: 90 | Refills: 3 | OUTPATIENT
Start: 2024-06-03 | End: 2025-05-29

## 2024-06-03 RX ORDER — BUPROPION HYDROCHLORIDE 150 MG/1
1 TABLET IN THE MORNING TABLET, EXTENDED RELEASE ORAL ONCE A DAY
Status: ACTIVE | COMMUNITY

## 2024-06-03 RX ORDER — BUSPIRONE HYDROCHLORIDE 10 MG/1
1 TABLET TABLET ORAL TWICE A DAY
Qty: 180 TABLET | Refills: 3 | Status: ACTIVE | COMMUNITY
Start: 2023-04-12

## 2024-06-03 RX ORDER — ESOMEPRAZOLE MAGNESIUM 40 MG/1
1 CAPSULE CAPSULE, DELAYED RELEASE ORAL ONCE A DAY
Qty: 90 | Refills: 3 | Status: ACTIVE | COMMUNITY

## 2024-06-03 RX ORDER — ZINC GLUCONATE 50 MG
1 TABLET TABLET ORAL ONCE A DAY
Status: ACTIVE | COMMUNITY

## 2024-06-03 RX ORDER — HYOSCYAMINE SULFATE 0.12 MG/1
1 TABLET AS NEEDED TABLET ORAL
Qty: 60 TABLET | Refills: 11 | Status: ACTIVE | COMMUNITY
Start: 2023-09-19 | End: 2024-09-13

## 2024-06-03 RX ORDER — ZOLPIDEM TARTRATE 10 MG/1
1 TABLET AT BEDTIME AS NEEDED TABLET, FILM COATED ORAL ONCE A DAY
Status: ACTIVE | COMMUNITY

## 2024-06-03 RX ORDER — VALSARTAN 160 MG/1
1 TABLET TABLET, FILM COATED ORAL ONCE A DAY
Status: ACTIVE | COMMUNITY

## 2024-06-03 RX ORDER — LANSOPRAZOLE 30 MG/1
1 CAPSULE BEFORE A MEAL CAPSULE, DELAYED RELEASE ORAL TWICE DAILY
Qty: 180 CAPSULE | Refills: 3 | Status: ACTIVE | COMMUNITY
Start: 2024-04-11

## 2024-06-03 RX ORDER — LEVOTHYROXINE SODIUM 75 UG/1
1 TABLET IN THE MORNING ON AN EMPTY STOMACH TABLET ORAL ONCE A DAY
Status: ACTIVE | COMMUNITY

## 2024-06-03 RX ORDER — LINACLOTIDE 290 UG/1
1 CAPSULE AT LEAST 30 MINUTES BEFORE A MEAL ON AN EMPTY STOMACH CAPSULE, GELATIN COATED ORAL ONCE A DAY
Qty: 90 CAPSULE | Refills: 3 | Status: ACTIVE | COMMUNITY
Start: 2023-08-09 | End: 2024-08-03

## 2024-06-03 RX ORDER — PRUCALOPRIDE 2 MG/1
1 TABLET TABLET, FILM COATED ORAL ONCE A DAY
Qty: 90 TABLET | Refills: 3 | Status: ACTIVE | COMMUNITY
Start: 2023-09-12 | End: 2024-09-13

## 2024-06-03 RX ORDER — SEMAGLUTIDE 1.34 MG/ML
AS DIRECTED INJECTION, SOLUTION SUBCUTANEOUS
Status: ACTIVE | COMMUNITY

## 2024-06-03 RX ORDER — ROSUVASTATIN 20 MG/1
1 TABLET TABLET, FILM COATED ORAL ONCE A DAY
Status: ACTIVE | COMMUNITY

## 2024-06-03 NOTE — HPI-TODAY'S VISIT:
8/20 Mrs. Carole Garcia is a 69-year-old female who presents via telephone/telehealth visit today for abdominal pain.  In the past she has followed with Dr. Polanco and Jazmyn.  She has history of diverticulitis.  Until 3 weeks ago, she was in her usual state of health and feeling well.  About that time she started to notice some abdominal bloating and distention with change in her bowel habits including smaller stools and less frequent stools.  She has been on Colace at bedtime for quite a while which has kept her regular.  She also takes Metamucil.  About a week ago she began to have various twinges of abdominal pain in the right lower quadrant and left upper quadrant.  She is concerned she may be developing diverticulitis.  No fever or chills.  The right lower quadrant pain resolved however she continues with the left mid to upper discomfort.  No tenderness when she pushes on this area. She has a CTA abdomen scheduled for Monday with her vascular surgeon.  She last had colonoscopy 1/2020 with a hyperplastic polyp in the ascending colon and diverticulosis of the sigmoid and descending colon-recommended to repeat in 5 years for personal history of polyps.  TG  9/10/2020 Ms. Garcia presents for follow up of diverticulitis. Since her last visit she is s/p flagyl (she is allergic to fluroquinlones). The pain has improved and yesterday she had a normal BM. At this point she is off AMT, she is on colace and mag citrate as needed. She is trying to take metamucil but can't tolerate the powder. She is taking her nexium 20mg daily. Her chronic abdominal pain has improved somewhat. Today she is doing better. MB  9/30/20  Ms Garcia returns to discuss possible diverticulitis flare. she reports worsening constipation in recent days despite metamucil and colace. She started having some lower midline pain and low grade temp. She has felt better in the last 24 hours, but still doesn't feel well. Her son's father is terminally ill and she will be having to go to [place] to see him. no additional complaints. SB   11/11/2020 Ms. Garcia presents for follow up of diverticulosis and GERD. Since her last visit she is doing better. She has been diagnosed with sleep apnea and placed on a CPAP. She did not take the bactrim as she is allergic to slufa drugs as well. She did take the flagyl for 14 days with relief. Her pain improved and at baseline. Her reflux is stable on nexium daily. Today she is doing well. MB  3/9/2021 Carole presents for follow-up of reflux, diverticulosis, history of diverticulitis, and constipation.  Since her last visit she called several weeks ago with worsening left lower quadrant abdominal pain.  She has a history of diverticulitis documented on CT in the sigmoid in 2019.  Her colonoscopy in January 2020 reveals diverticulosis.  She completed the Flagyl as this is the only antibiotic that she can tolerate and her pain improved but she continues to struggle with incomplete evacuation and spasm and constipation.  She is on MiraLAX daily, Colace, and milk of magnesia with no relief.  She has tried Linzess and Amitiza in the past with no relief.  She is also taken lactulose in the distant past with no relief.  Today she is frustrated with her symptoms and her constipation.  MB  4/27/2021 Mrs. Garcia presents for follow-up of reflux, constipation, and diverticulitis.  Since her last visit she was doing great on Motegrity however this was astronomically expensive for her.  She is now on 2 Colace and 1 Senokot at night with a good bowel movement daily.  She struggles tolerating fiber but has tried to increase her fiber in her diet.  She states her reflux stable Nexium daily.  Today she is doing well with no new GI complaints.  MB 7/14/2022 Carole presents for follow-up of constipation.  Since her last visit she continues to struggle with evacuation.  She has tenesmus and passes mucus at times.  We have failed an appeal with [email or web address] however this worked tremendously better in the past she would like to reappeal.  Today we have discussed evaluation for pelvic floor dysfunction given her surgical history.  We will check a sits marker study and consider the pelvic floor therapy pending results.  Her reflux is stable.  Her last colonoscopy was done in 2020 with 1 polyp.  She denies any flares of diverticulitis.  Today she is doing well other than her constipation.  MB 11/14/2022 Carole presents for follow-up of reflux, bloating, constipation with slow transit, and history of diverticulitis.  Since her last visit we did appeal for Motegrity and this was approved.  This is no longer working with the same result.  She continues to go days without a bowel movement and then has to use a Dulcolax suppository for relief.  She never has the urge to have a bowel movement.  Her colonoscopy in 2020 is normal other than diverticular disease.  She has a history of prolapse repair, we have discussed she likely has a component of pelvic floor dysfunction.  I am going to refer her to pelvic floor therapy for further evaluation.  She is also had increased reflux with heartburn.  She has dysphagia to solids.  She is on Nexium 20 mg daily.  Her last EGD was in 2015 with reflux and gastritis.  She agrees to repeat EGD with dilation.  She is concerned about small intestinal bacterial overgrowth, we will biopsy her small bowel at the time of her procedure.  I suspect that her symptoms are multifactorial however ultimately her bloating is likely related to her constipation.  She has not lost any weight.  We will follow-up pending her endoscopic results. MB 1/30/2023 Ms. Lam returns to clinic for follow-up of reflux, bloating, constipation and slow transit history of diverticulitis.  She previously underwent upper endoscopy earlier this month which showed nonsevere esophagitis with nonconcerning pathology.  2 cm hiatal hernia, nonobstructing Schatzki ring dilated with an 18-19-20 mm balloon dilator.  Diffuse mild inflammation was found in the gastric antrum.  Large nonbleeding diverticulum was found in the second portion of the duodenum. Continues on Nexium 40 mg daily, had an episode 2 weeks ago with really bad reflux stating the pain was so bad it was burning into her back and gums giving her ulcers in her mouth.  This dissipated on its own.  She does endorse that she could have had some type of viral illness.  She continues on Motegrity 2 mg daily with MiraLAX and no Colace.  She states at this point she has no urge to defecate and will be presenting for evaluation with pelvic floor therapy in March.  She started new probiotic that her daughter-in-law recommended which is herbal from [employer] and it's helping. She is seeing vascular soon due to her significant family history.  Has not tried FD guard but is willing to start.  SP 4/12/2023 Carole presents for follow-up of abdominal pain and constipation.  Since her last visit she continues to struggle with epigastric abdominal pain that begins at 5 PM every day and last until 7 or 8 or until she gets to bed.  Having a bowel movement does seem to improve the symptoms.  She has a bowel movement most days on the Motegrity but reports incomplete evacuation.  Her EGD in January of this year shows mild gastritis.  Her colonoscopy in 2020 is normal.  Her last contrasted CT scan was in 2019 with acute diverticulitis.  She status post cholecystectomy.  She has tried multiple treatment modalities for the symptoms including Xifaxan and amitriptyline in the past with no relief.  This pain has been going on for years at this point.  Today she is tearful about her symptoms.  She does agree to a trial of BuSpar twice daily for functional dyspepsia.  She feels that the Motegrity is too expensive and does not work well, she would like to give Ibsrela 50 mg twice daily a try, she does agree to consider lactulose if this is no help.  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  She is scheduled to see the pelvic floor therapist this month, her sits marker study does show likely pelvic floor dysfunction.  Today she has multiple GI complaints and is tearful.  MB 7/11/2023 The patient presents today for follow-up of her abdominal pain and constipation.  Since her last visit, she continues to struggle with her bowels.  She was started on is Isael, but she has only been able to take this once a day.  She is concerned if she takes it twice a day, that she will not have any control of her bowels.  She has been attending pelvic floor therapy, but she is unsure if this is helped significantly.  She feels that she does not get evacuation of her bowels.  She does use Dulcolax suppositories, and this does seem to help somewhat.  Jazmyn also started her on BuSpar at her last visit.  She was having significant symptoms of gas and bloating.  She has been started on Ozempic, and we did discuss that this is likely contributing to some of her symptoms.  She does continue to have symptoms of reflux.  Her main issue is her bowels.  She is tearful as to how her bowels control her life.  It is affecting her relationship with her  and her ability to proceed with her day-to-day activities.  Today, we have discussed the goal of trying to have fairly regular bowel movements without accidents.  I do want her to go back on Metamucil.  After discussing all of the medication she has tried in the past, it does seem that likely Linzess has worked the best.  I am going to give her samples of the lowest dose of Linzess.  I am also going to give her samples of the highest dose.  I have explained to her that we need to find out what dose will work the best for her.  She is going to send us a message on the portal as to which one we need to order.  She is going to take Linzess in the morning with Metamucil in the evening.  She will follow-up with Jazmyn in 3 to 4 months for further evaluation. 9/12/2023 Carole presents for follow-up of reflux, constipation, and history of diverticulitis.  Since her last visit she continues to work with pelvic floor therapy.  She is taking the Linzess 290 mcg most days a week however she does experience lack of emptying with a sense of urgency and then explosive diarrhea.  In reviewing all the medication she is taken in the past, Motegrity seem to work the best.  We will repeat a trial of this.  We have discussed MRI defecography and anorectal manometry.  Unfortunately regardless of these test she has failed all medical therapy.  Her pelvic floor therapist has recommended or mentioned biofeedback.  She is going to discuss that further with her pelvic floor therapy team, okay to refer for anorectal manometry if necessary.  Her last colonoscopy was in 2020 with diverticular disease.  Her reflux is stable on as omeprazole 40 mg daily.  Today she continues to struggle with her GI complaints.  JATINDER Edmondsita presents for follow-up of colonoscopy, history of recurrent diverticulitis, history of constipation with pelvic floor dysfunction, and reflux.  Since her last visit she had a violent episode of reflux and regurgitation after eating at [employer].  She is on as omeprazole 40 in the morning and famotidine at night.  She agrees her diet could be better.  She agrees to try Dexilant 60 mg daily and lansoprazole 30 mg twice daily if not covered.  She continues to struggle with constipation but is overall doing well on Motegrity 2 mg in the morning, MiraLAX in the morning, 5 capsules of psyllium in the evening, and 1 Senokot.  She uses a glycerin or Dulcolax suppository every 2 to 3 days with no bowel movement.  She had another episode of diverticulitis with questionable mass effect in the sigmoid.  Her colonoscopy in March shows no colonic mass but diverticular disease.  Today we have discussed her diverticulitis recurrence, she has had sigmoid disease in 2017, 2019, 2020, 2023, and this year.  She feels like she never gets over the episodes.  She is still working with pelvic floor therapy.  She still struggles with evacuation.  Today she agrees to pursue anorectal manometry, to see if she is a candidate for biofeedback.  She is using a squatty potty.  She does agree to a surgical consultation with Dr. Ochoa for her pelvic floor dysfunction and also recurrent diverticulitis.  We will increase her PPI as well.  MB 6/3/2024 Carole returns to clinic for follow-up after anal manometry testing ordered by Jazmyn.  She has not heard from [place] regarding scheduling her biofeedback quite yet. She did see Dr. Ramos and has no immediate plans for surgery. Today she would like to discuss advancing her treatment for constipation.  He continues on her previous combination of Motegrity, Colace, MiraLAX, fiber.  She has failed Linzess in the past but she states this was due to severe explosive diarrhea which lasted a few days.  She did not continue on the medication past the purge..  She is willing to restart and see if she can tolerate to determine what her baseline on this medication would be. Today we discussed starting Linzess 72 mcg and she will return in 4 months hopefully will have completed biofeedback at that time.  If she has recurring diverticulitis episode she is to contact Dr. Ramos. She has no other GI complaints today, GERD symptoms well-controlled. SP

## 2024-07-17 ENCOUNTER — WEB ENCOUNTER (OUTPATIENT)
Dept: URBAN - NONMETROPOLITAN AREA CLINIC 13 | Facility: CLINIC | Age: 74
End: 2024-07-17

## 2024-07-23 ENCOUNTER — OFFICE VISIT (OUTPATIENT)
Dept: URBAN - NONMETROPOLITAN AREA CLINIC 2 | Facility: CLINIC | Age: 74
End: 2024-07-23

## 2024-10-01 ENCOUNTER — OFFICE VISIT (OUTPATIENT)
Dept: URBAN - NONMETROPOLITAN AREA CLINIC 2 | Facility: CLINIC | Age: 74
End: 2024-10-01
Payer: MEDICARE

## 2024-10-01 VITALS
WEIGHT: 204 LBS | HEIGHT: 67 IN | BODY MASS INDEX: 32.02 KG/M2 | SYSTOLIC BLOOD PRESSURE: 136 MMHG | HEART RATE: 72 BPM | DIASTOLIC BLOOD PRESSURE: 84 MMHG

## 2024-10-01 DIAGNOSIS — Z86.0100 HISTORY OF COLON POLYPS: ICD-10-CM

## 2024-10-01 DIAGNOSIS — K59.04 CHRONIC IDIOPATHIC CONSTIPATION: ICD-10-CM

## 2024-10-01 DIAGNOSIS — K21.9 GERD: ICD-10-CM

## 2024-10-01 DIAGNOSIS — K57.10 DUODENAL DIVERTICULUM: ICD-10-CM

## 2024-10-01 DIAGNOSIS — K57.30 ACQUIRED DIVERTICULOSIS OF COLON: ICD-10-CM

## 2024-10-01 PROCEDURE — 99212 OFFICE O/P EST SF 10 MIN: CPT

## 2024-10-01 RX ORDER — VALSARTAN 160 MG/1
1 TABLET TABLET, FILM COATED ORAL ONCE A DAY
Status: ACTIVE | COMMUNITY

## 2024-10-01 RX ORDER — GABAPENTIN 300 MG/1
CAPSULE ORAL
Qty: 75 CAPSULE | Status: ACTIVE | COMMUNITY

## 2024-10-01 RX ORDER — BUPROPION HYDROCHLORIDE 150 MG/1
1 TABLET IN THE MORNING TABLET, EXTENDED RELEASE ORAL ONCE A DAY
Status: ACTIVE | COMMUNITY

## 2024-10-01 RX ORDER — SEMAGLUTIDE 1.34 MG/ML
AS DIRECTED INJECTION, SOLUTION SUBCUTANEOUS
Status: ACTIVE | COMMUNITY

## 2024-10-01 RX ORDER — ROSUVASTATIN 20 MG/1
1 TABLET TABLET, FILM COATED ORAL ONCE A DAY
Status: ACTIVE | COMMUNITY

## 2024-10-01 RX ORDER — METOPROLOL TARTRATE 25 MG/1
TAKE ONE TABLET BY MOUTH TWICE A DAY TABLET, FILM COATED ORAL
Qty: 60 UNSPECIFIED | Refills: 1 | Status: ACTIVE | COMMUNITY

## 2024-10-01 RX ORDER — PRUCALOPRIDE 2 MG/1
TAKE ONE TABLET BY MOUTH ONE TIME DAILY TABLET, FILM COATED ORAL
Qty: 30 UNSPECIFIED | Refills: 8 | Status: ACTIVE | COMMUNITY

## 2024-10-01 RX ORDER — LINACLOTIDE 72 UG/1
1 CAPSULE AT LEAST 30 MINUTES BEFORE THE FIRST MEAL OF THE DAY ON AN EMPTY STOMACH CAPSULE, GELATIN COATED ORAL ONCE A DAY
Qty: 90 | Refills: 3 | Status: ACTIVE | COMMUNITY
Start: 2024-06-03 | End: 2025-05-29

## 2024-10-01 RX ORDER — LINACLOTIDE 72 UG/1
1 CAPSULE AT LEAST 30 MINUTES BEFORE THE FIRST MEAL OF THE DAY ON AN EMPTY STOMACH CAPSULE, GELATIN COATED ORAL ONCE A DAY
Qty: 90 | Refills: 3 | OUTPATIENT

## 2024-10-01 RX ORDER — BUSPIRONE HYDROCHLORIDE 10 MG/1
1 TABLET TABLET ORAL TWICE A DAY
Qty: 180 TABLET | Refills: 3 | Status: ACTIVE | COMMUNITY
Start: 2023-04-12

## 2024-10-01 RX ORDER — LANSOPRAZOLE 30 MG/1
1 CAPSULE BEFORE A MEAL CAPSULE, DELAYED RELEASE ORAL TWICE DAILY
Qty: 180 CAPSULE | Refills: 3 | Status: ACTIVE | COMMUNITY
Start: 2024-04-11

## 2024-10-01 RX ORDER — ZINC GLUCONATE 50 MG
1 TABLET TABLET ORAL ONCE A DAY
Status: ACTIVE | COMMUNITY

## 2024-10-01 RX ORDER — LEVOTHYROXINE SODIUM 75 UG/1
1 TABLET IN THE MORNING ON AN EMPTY STOMACH TABLET ORAL ONCE A DAY
Status: ACTIVE | COMMUNITY

## 2024-10-01 RX ORDER — ZOLPIDEM TARTRATE 10 MG/1
1 TABLET AT BEDTIME AS NEEDED TABLET, FILM COATED ORAL ONCE A DAY
Status: ACTIVE | COMMUNITY

## 2024-10-01 RX ORDER — CYCLOBENZAPRINE HYDROCHLORIDE 10 MG/1
1 TABLET AT BEDTIME AS NEEDED TABLET, FILM COATED ORAL ONCE A DAY
Status: ACTIVE | COMMUNITY

## 2024-10-01 RX ORDER — NITROFURANTOIN MACROCRYSTALS 50 MG/1
TAKE ONE CAPSULE BY MOUTH ONE TIME DAILY CAPSULE ORAL
Qty: 90 UNSPECIFIED | Refills: 3 | Status: ACTIVE | COMMUNITY

## 2024-10-01 RX ORDER — AMLODIPINE BESYLATE 10 MG/1
TAKE ONE TABLET BY MOUTH ONE TIME DAILY TABLET ORAL
Qty: 90 UNSPECIFIED | Refills: 3 | Status: ACTIVE | COMMUNITY

## 2024-10-01 NOTE — HPI-TODAY'S VISIT:
8/20 Mrs. Carole Garcia is a 69-year-old female who presents via telephone/telehealth visit today for abdominal pain.  In the past she has followed with Dr. Polanco and Jazmyn.  She has history of diverticulitis.  Until 3 weeks ago, she was in her usual state of health and feeling well.  About that time she started to notice some abdominal bloating and distention with change in her bowel habits including smaller stools and less frequent stools.  She has been on Colace at bedtime for quite a while which has kept her regular.  She also takes Metamucil.  About a week ago she began to have various twinges of abdominal pain in the right lower quadrant and left upper quadrant.  She is concerned she may be developing diverticulitis.  No fever or chills.  The right lower quadrant pain resolved however she continues with the left mid to upper discomfort.  No tenderness when she pushes on this area. She has a CTA abdomen scheduled for Monday with her vascular surgeon.  She last had colonoscopy 1/2020 with a hyperplastic polyp in the ascending colon and diverticulosis of the sigmoid and descending colon-recommended to repeat in 5 years for personal history of polyps.  TG  9/10/2020 Ms. Garcia presents for follow up of diverticulitis. Since her last visit she is s/p flagyl (she is allergic to fluroquinlones). The pain has improved and yesterday she had a normal BM. At this point she is off AMT, she is on colace and mag citrate as needed. She is trying to take metamucil but can't tolerate the powder. She is taking her nexium 20mg daily. Her chronic abdominal pain has improved somewhat. Today she is doing better. MB  9/30/20  Ms Garcia returns to discuss possible diverticulitis flare. she reports worsening constipation in recent days despite metamucil and colace. She started having some lower midline pain and low grade temp. She has felt better in the last 24 hours, but still doesn't feel well. Her son's father is terminally ill and she will be having to go to [place] to see him. no additional complaints. SB   11/11/2020 Ms. Garcia presents for follow up of diverticulosis and GERD. Since her last visit she is doing better. She has been diagnosed with sleep apnea and placed on a CPAP. She did not take the bactrim as she is allergic to slufa drugs as well. She did take the flagyl for 14 days with relief. Her pain improved and at baseline. Her reflux is stable on nexium daily. Today she is doing well. MB  3/9/2021 Carole presents for follow-up of reflux, diverticulosis, history of diverticulitis, and constipation.  Since her last visit she called several weeks ago with worsening left lower quadrant abdominal pain.  She has a history of diverticulitis documented on CT in the sigmoid in 2019.  Her colonoscopy in January 2020 reveals diverticulosis.  She completed the Flagyl as this is the only antibiotic that she can tolerate and her pain improved but she continues to struggle with incomplete evacuation and spasm and constipation.  She is on MiraLAX daily, Colace, and milk of magnesia with no relief.  She has tried Linzess and Amitiza in the past with no relief.  She is also taken lactulose in the distant past with no relief.  Today she is frustrated with her symptoms and her constipation.  MB  4/27/2021 Mrs. Garcia presents for follow-up of reflux, constipation, and diverticulitis.  Since her last visit she was doing great on Motegrity however this was astronomically expensive for her.  She is now on 2 Colace and 1 Senokot at night with a good bowel movement daily.  She struggles tolerating fiber but has tried to increase her fiber in her diet.  She states her reflux stable Nexium daily.  Today she is doing well with no new GI complaints.  MB 7/14/2022 Carole presents for follow-up of constipation.  Since her last visit she continues to struggle with evacuation.  She has tenesmus and passes mucus at times.  We have failed an appeal with [employer] however this worked tremendously better in the past she would like to reappeal.  Today we have discussed evaluation for pelvic floor dysfunction given her surgical history.  We will check a sits marker study and consider the pelvic floor therapy pending results.  Her reflux is stable.  Her last colonoscopy was done in 2020 with 1 polyp.  She denies any flares of diverticulitis.  Today she is doing well other than her constipation.  MB 11/14/2022 Carole presents for follow-up of reflux, bloating, constipation with slow transit, and history of diverticulitis.  Since her last visit we did appeal for Motegrity and this was approved.  This is no longer working with the same result.  She continues to go days without a bowel movement and then has to use a Dulcolax suppository for relief.  She never has the urge to have a bowel movement.  Her colonoscopy in 2020 is normal other than diverticular disease.  She has a history of prolapse repair, we have discussed she likely has a component of pelvic floor dysfunction.  I am going to refer her to pelvic floor therapy for further evaluation.  She is also had increased reflux with heartburn.  She has dysphagia to solids.  She is on Nexium 20 mg daily.  Her last EGD was in 2015 with reflux and gastritis.  She agrees to repeat EGD with dilation.  She is concerned about small intestinal bacterial overgrowth, we will biopsy her small bowel at the time of her procedure.  I suspect that her symptoms are multifactorial however ultimately her bloating is likely related to her constipation.  She has not lost any weight.  We will follow-up pending her endoscopic results. MB 1/30/2023 Ms. Lam returns to clinic for follow-up of reflux, bloating, constipation and slow transit history of diverticulitis.  She previously underwent upper endoscopy earlier this month which showed nonsevere esophagitis with nonconcerning pathology.  2 cm hiatal hernia, nonobstructing Schatzki ring dilated with an 18-19-20 mm balloon dilator.  Diffuse mild inflammation was found in the gastric antrum.  Large nonbleeding diverticulum was found in the second portion of the duodenum. Continues on Nexium 40 mg daily, had an episode 2 weeks ago with really bad reflux stating the pain was so bad it was burning into her back and gums giving her ulcers in her mouth.  This dissipated on its own.  She does endorse that she could have had some type of viral illness.  She continues on Motegrity 2 mg daily with MiraLAX and no Colace.  She states at this point she has no urge to defecate and will be presenting for evaluation with pelvic floor therapy in March.  She started new probiotic that her daughter-in-law recommended which is herbal from [employer] and it's helping. She is seeing vascular soon due to her significant family history.  Has not tried FD guard but is willing to start.  SP 4/12/2023 Carole presents for follow-up of abdominal pain and constipation.  Since her last visit she continues to struggle with epigastric abdominal pain that begins at 5 PM every day and last until 7 or 8 or until she gets to bed.  Having a bowel movement does seem to improve the symptoms.  She has a bowel movement most days on the Motegrity but reports incomplete evacuation.  Her EGD in January of this year shows mild gastritis.  Her colonoscopy in 2020 is normal.  Her last contrasted CT scan was in 2019 with acute diverticulitis.  She status post cholecystectomy.  She has tried multiple treatment modalities for the symptoms including Xifaxan and amitriptyline in the past with no relief.  This pain has been going on for years at this point.  Today she is tearful about her symptoms.  She does agree to a trial of BuSpar twice daily for functional dyspepsia.  She feels that the Motegrity is too expensive and does not work well, she would like to give Ibsrela 50 mg twice daily a try, she does agree to consider lactulose if this is no help.  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  She is scheduled to see the pelvic floor therapist this month, her sits marker study does show likely pelvic floor dysfunction.  Today she has multiple GI complaints and is tearful.  MB 7/11/2023 The patient presents today for follow-up of her abdominal pain and constipation.  Since her last visit, she continues to struggle with her bowels.  She was started on is Isael, but she has only been able to take this once a day.  She is concerned if she takes it twice a day, that she will not have any control of her bowels.  She has been attending pelvic floor therapy, but she is unsure if this is helped significantly.  She feels that she does not get evacuation of her bowels.  She does use Dulcolax suppositories, and this does seem to help somewhat.  Jazmyn also started her on BuSpar at her last visit.  She was having significant symptoms of gas and bloating.  She has been started on Ozempic, and we did discuss that this is likely contributing to some of her symptoms.  She does continue to have symptoms of reflux.  Her main issue is her bowels.  She is tearful as to how her bowels control her life.  It is affecting her relationship with her  and her ability to proceed with her day-to-day activities.  Today, we have discussed the goal of trying to have fairly regular bowel movements without accidents.  I do want her to go back on Metamucil.  After discussing all of the medication she has tried in the past, it does seem that likely Linzess has worked the best.  I am going to give her samples of the lowest dose of Linzess.  I am also going to give her samples of the highest dose.  I have explained to her that we need to find out what dose will work the best for her.  She is going to send us a message on the portal as to which one we need to order.  She is going to take Linzess in the morning with Metamucil in the evening.  She will follow-up with Jazmyn in 3 to 4 months for further evaluation. 9/12/2023 Carole presents for follow-up of reflux, constipation, and history of diverticulitis.  Since her last visit she continues to work with pelvic floor therapy.  She is taking the Linzess 290 mcg most days a week however she does experience lack of emptying with a sense of urgency and then explosive diarrhea.  In reviewing all the medication she is taken in the past, Motegrity seem to work the best.  We will repeat a trial of this.  We have discussed MRI defecography and anorectal manometry.  Unfortunately regardless of these test she has failed all medical therapy.  Her pelvic floor therapist has recommended or mentioned biofeedback.  She is going to discuss that further with her pelvic floor therapy team, okay to refer for anorectal manometry if necessary.  Her last colonoscopy was in 2020 with diverticular disease.  Her reflux is stable on as omeprazole 40 mg daily.  Today she continues to struggle with her GI complaints.  JATINDER Edmondsita presents for follow-up of colonoscopy, history of recurrent diverticulitis, history of constipation with pelvic floor dysfunction, and reflux.  Since her last visit she had a violent episode of reflux and regurgitation after eating at [employer].  She is on as omeprazole 40 in the morning and famotidine at night.  She agrees her diet could be better.  She agrees to try Dexilant 60 mg daily and lansoprazole 30 mg twice daily if not covered.  She continues to struggle with constipation but is overall doing well on Motegrity 2 mg in the morning, MiraLAX in the morning, 5 capsules of psyllium in the evening, and 1 Senokot.  She uses a glycerin or Dulcolax suppository every 2 to 3 days with no bowel movement.  She had another episode of diverticulitis with questionable mass effect in the sigmoid.  Her colonoscopy in March shows no colonic mass but diverticular disease.  Today we have discussed her diverticulitis recurrence, she has had sigmoid disease in 2017, 2019, 2020, 2023, and this year.  She feels like she never gets over the episodes.  She is still working with pelvic floor therapy.  She still struggles with evacuation.  Today she agrees to pursue anorectal manometry, to see if she is a candidate for biofeedback.  She is using a squatty potty.  She does agree to a surgical consultation with Dr. Ochoa for her pelvic floor dysfunction and also recurrent diverticulitis.  We will increase her PPI as well.  MB 6/3/2024 Carole returns to clinic for follow-up after anal manometry testing ordered by Jazmyn.  She has not heard from [place] regarding scheduling her biofeedback quite yet. She did see Dr. Ramos and has no immediate plans for surgery. Today she would like to discuss advancing her treatment for constipation.  He continues on her previous combination of Motegrity, Colace, MiraLAX, fiber.  She has failed Linzess in the past but she states this was due to severe explosive diarrhea which lasted a few days.  She did not continue on the medication past the purge..  She is willing to restart and see if she can tolerate to determine what her baseline on this medication would be. Today we discussed starting Linzess 72 mcg and she will return in 4 months hopefully will have completed biofeedback at that time.  If she has recurring diverticulitis episode she is to contact Dr. Ramos. She has no other GI complaints today, GERD symptoms well-controlled. SP  10/1/2024 Carole returns to clinic for follow up of constipation. She recently suffered from shingles with rash and pain right lower back, onset was 4 months ago and she continues gabapentin. Due to this ongoing pain she  has put off trying Linzess and any pursuit for  elective surgical resection with Dr. Ochoa. Feels she was doing welll enough with daily BM and soft stool. It does seem "squeezed" and smaller caliber. She will let us know if this continues. Denies blood in stool.  Feels she has return of  narrowing of esophagus, pills sluggish feeling returned on transit, nothing is getting stuck. Since starting gabapentin, she has not experienced any epigastric discomfort and does not feel she needs to chew gum through the day for relief.  SP

## 2024-10-29 ENCOUNTER — WEB ENCOUNTER (OUTPATIENT)
Dept: URBAN - NONMETROPOLITAN AREA CLINIC 13 | Facility: CLINIC | Age: 74
End: 2024-10-29

## 2024-10-29 RX ORDER — PRUCALOPRIDE 2 MG/1
TAKE ONE TABLET BY MOUTH ONE TIME DAILY TABLET, FILM COATED ORAL ONCE A DAY
Qty: 90 TABLET | Refills: 3
End: 2025-10-25

## 2024-10-30 ENCOUNTER — WEB ENCOUNTER (OUTPATIENT)
Dept: URBAN - NONMETROPOLITAN AREA CLINIC 13 | Facility: CLINIC | Age: 74
End: 2024-10-30

## 2024-10-30 ENCOUNTER — TELEPHONE ENCOUNTER (OUTPATIENT)
Dept: URBAN - NONMETROPOLITAN AREA CLINIC 2 | Facility: CLINIC | Age: 74
End: 2024-10-30

## 2024-10-30 RX ORDER — PRUCALOPRIDE 2 MG/1
TAKE ONE TABLET BY MOUTH ONE TIME DAILY TABLET, FILM COATED ORAL ONCE A DAY
Qty: 90 TABLET | Refills: 3

## 2025-02-02 ENCOUNTER — WEB ENCOUNTER (OUTPATIENT)
Dept: URBAN - NONMETROPOLITAN AREA CLINIC 2 | Facility: CLINIC | Age: 75
End: 2025-02-02

## 2025-02-02 RX ORDER — HYOSCYAMINE SULFATE 0.12 MG/1
1 TABLET UNDER THE TONGUE AND ALLOW TO DISSOLVE AS NEEDED TABLET, ORALLY DISINTEGRATING ORAL THREE TIMES A DAY
Qty: 90 | Refills: 1 | OUTPATIENT
Start: 2025-02-04 | End: 2025-04-05

## 2025-04-02 ENCOUNTER — OFFICE VISIT (OUTPATIENT)
Dept: URBAN - NONMETROPOLITAN AREA CLINIC 2 | Facility: CLINIC | Age: 75
End: 2025-04-02
Payer: MEDICARE

## 2025-04-02 DIAGNOSIS — K57.92 DIVERTICULITIS: ICD-10-CM

## 2025-04-02 DIAGNOSIS — Z86.0100 HISTORY OF COLON POLYPS: ICD-10-CM

## 2025-04-02 DIAGNOSIS — K57.10 DUODENAL DIVERTICULUM: ICD-10-CM

## 2025-04-02 DIAGNOSIS — K59.04 CHRONIC IDIOPATHIC CONSTIPATION: ICD-10-CM

## 2025-04-02 DIAGNOSIS — K21.9 GERD: ICD-10-CM

## 2025-04-02 PROCEDURE — 99213 OFFICE O/P EST LOW 20 MIN: CPT

## 2025-04-02 RX ORDER — NITROFURANTOIN MACROCRYSTALS 50 MG/1
TAKE ONE CAPSULE BY MOUTH ONE TIME DAILY CAPSULE ORAL
Qty: 90 UNSPECIFIED | Refills: 3 | Status: ACTIVE | COMMUNITY

## 2025-04-02 RX ORDER — LINACLOTIDE 72 UG/1
1 CAPSULE AT LEAST 30 MINUTES BEFORE THE FIRST MEAL OF THE DAY ON AN EMPTY STOMACH CAPSULE, GELATIN COATED ORAL ONCE A DAY
Qty: 90 | Refills: 3 | Status: ACTIVE | COMMUNITY

## 2025-04-02 RX ORDER — CYCLOBENZAPRINE HYDROCHLORIDE 10 MG/1
1 TABLET AT BEDTIME AS NEEDED TABLET, FILM COATED ORAL ONCE A DAY
Status: ACTIVE | COMMUNITY

## 2025-04-02 RX ORDER — METOPROLOL TARTRATE 25 MG/1
TAKE ONE TABLET BY MOUTH TWICE A DAY TABLET, FILM COATED ORAL
Qty: 60 UNSPECIFIED | Refills: 1 | Status: ACTIVE | COMMUNITY

## 2025-04-02 RX ORDER — PRUCALOPRIDE 2 MG/1
TAKE ONE TABLET BY MOUTH ONE TIME DAILY TABLET, FILM COATED ORAL ONCE A DAY
Qty: 90 TABLET | Refills: 3 | Status: ACTIVE | COMMUNITY

## 2025-04-02 RX ORDER — BUSPIRONE HYDROCHLORIDE 10 MG/1
1 TABLET TABLET ORAL TWICE A DAY
Qty: 180 TABLET | Refills: 3 | Status: ACTIVE | COMMUNITY
Start: 2023-04-12

## 2025-04-02 RX ORDER — VALSARTAN 160 MG/1
1 TABLET TABLET, FILM COATED ORAL ONCE A DAY
Status: ACTIVE | COMMUNITY

## 2025-04-02 RX ORDER — ROSUVASTATIN 20 MG/1
1 TABLET TABLET, FILM COATED ORAL ONCE A DAY
Status: ACTIVE | COMMUNITY

## 2025-04-02 RX ORDER — HYOSCYAMINE SULFATE 0.12 MG/1
1 TABLET UNDER THE TONGUE AND ALLOW TO DISSOLVE AS NEEDED TABLET, ORALLY DISINTEGRATING ORAL THREE TIMES A DAY
Qty: 90 | Refills: 1 | Status: ACTIVE | COMMUNITY
Start: 2025-02-04 | End: 2025-04-05

## 2025-04-02 RX ORDER — LEVOTHYROXINE SODIUM 75 UG/1
1 TABLET IN THE MORNING ON AN EMPTY STOMACH TABLET ORAL ONCE A DAY
Status: ACTIVE | COMMUNITY

## 2025-04-02 RX ORDER — BUPROPION HYDROCHLORIDE 150 MG/1
1 TABLET IN THE MORNING TABLET, EXTENDED RELEASE ORAL ONCE A DAY
Status: ACTIVE | COMMUNITY

## 2025-04-02 RX ORDER — LANSOPRAZOLE 30 MG/1
1 CAPSULE BEFORE A MEAL CAPSULE, DELAYED RELEASE ORAL TWICE DAILY
Qty: 180 CAPSULE | Refills: 3 | Status: ACTIVE | COMMUNITY
Start: 2024-04-11

## 2025-04-02 RX ORDER — ZINC GLUCONATE 50 MG
1 TABLET TABLET ORAL ONCE A DAY
Status: ACTIVE | COMMUNITY

## 2025-04-02 RX ORDER — SEMAGLUTIDE 1.34 MG/ML
AS DIRECTED INJECTION, SOLUTION SUBCUTANEOUS
Status: ACTIVE | COMMUNITY

## 2025-04-02 RX ORDER — AMLODIPINE BESYLATE 10 MG/1
TAKE ONE TABLET BY MOUTH ONE TIME DAILY TABLET ORAL
Qty: 90 UNSPECIFIED | Refills: 3 | Status: ACTIVE | COMMUNITY

## 2025-04-02 RX ORDER — ZOLPIDEM TARTRATE 10 MG/1
1 TABLET AT BEDTIME AS NEEDED TABLET, FILM COATED ORAL ONCE A DAY
Status: ACTIVE | COMMUNITY

## 2025-04-02 RX ORDER — GABAPENTIN 300 MG/1
CAPSULE ORAL
Qty: 75 CAPSULE | Status: ACTIVE | COMMUNITY

## 2025-04-02 NOTE — HPI-TODAY'S VISIT:
8/20 Mrs. Carole Garcia is a 69-year-old female who presents via telephone/telehealth visit today for abdominal pain.  In the past she has followed with Dr. Polanco and Jazmyn.  She has history of diverticulitis.  Until 3 weeks ago, she was in her usual state of health and feeling well.  About that time she started to notice some abdominal bloating and distention with change in her bowel habits including smaller stools and less frequent stools.  She has been on Colace at bedtime for quite a while which has kept her regular.  She also takes Metamucil.  About a week ago she began to have various twinges of abdominal pain in the right lower quadrant and left upper quadrant.  She is concerned she may be developing diverticulitis.  No fever or chills.  The right lower quadrant pain resolved however she continues with the left mid to upper discomfort.  No tenderness when she pushes on this area. She has a CTA abdomen scheduled for Monday with her vascular surgeon.  She last had colonoscopy 1/2020 with a hyperplastic polyp in the ascending colon and diverticulosis of the sigmoid and descending colon-recommended to repeat in 5 years for personal history of polyps.  TG  9/10/2020 Ms. Garcia presents for follow up of diverticulitis. Since her last visit she is s/p flagyl (she is allergic to fluroquinlones). The pain has improved and yesterday she had a normal BM. At this point she is off AMT, she is on colace and mag citrate as needed. She is trying to take metamucil but can't tolerate the powder. She is taking her nexium 20mg daily. Her chronic abdominal pain has improved somewhat. Today she is doing better. MB  9/30/20  Ms Garcia returns to discuss possible diverticulitis flare. she reports worsening constipation in recent days despite metamucil and colace. She started having some lower midline pain and low grade temp. She has felt better in the last 24 hours, but still doesn't feel well. Her son's father is terminally ill and she will be having to go to [place] to see him. no additional complaints. SB   11/11/2020 Ms. Garcia presents for follow up of diverticulosis and GERD. Since her last visit she is doing better. She has been diagnosed with sleep apnea and placed on a CPAP. She did not take the bactrim as she is allergic to slufa drugs as well. She did take the flagyl for 14 days with relief. Her pain improved and at baseline. Her reflux is stable on nexium daily. Today she is doing well. MB  3/9/2021 Carole presents for follow-up of reflux, diverticulosis, history of diverticulitis, and constipation.  Since her last visit she called several weeks ago with worsening left lower quadrant abdominal pain.  She has a history of diverticulitis documented on CT in the sigmoid in 2019.  Her colonoscopy in January 2020 reveals diverticulosis.  She completed the Flagyl as this is the only antibiotic that she can tolerate and her pain improved but she continues to struggle with incomplete evacuation and spasm and constipation.  She is on MiraLAX daily, Colace, and milk of magnesia with no relief.  She has tried Linzess and Amitiza in the past with no relief.  She is also taken lactulose in the distant past with no relief.  Today she is frustrated with her symptoms and her constipation.  MB  4/27/2021 Mrs. Garcia presents for follow-up of reflux, constipation, and diverticulitis.  Since her last visit she was doing great on Motegrity however this was astronomically expensive for her.  She is now on 2 Colace and 1 Senokot at night with a good bowel movement daily.  She struggles tolerating fiber but has tried to increase her fiber in her diet.  She states her reflux stable Nexium daily.  Today she is doing well with no new GI complaints.  MB 7/14/2022 Carole presents for follow-up of constipation.  Since her last visit she continues to struggle with evacuation.  She has tenesmus and passes mucus at times.  We have failed an appeal with [employer] however this worked tremendously better in the past she would like to reappeal.  Today we have discussed evaluation for pelvic floor dysfunction given her surgical history.  We will check a sits marker study and consider the pelvic floor therapy pending results.  Her reflux is stable.  Her last colonoscopy was done in 2020 with 1 polyp.  She denies any flares of diverticulitis.  Today she is doing well other than her constipation.  MB 11/14/2022 Carole presents for follow-up of reflux, bloating, constipation with slow transit, and history of diverticulitis.  Since her last visit we did appeal for Motegrity and this was approved.  This is no longer working with the same result.  She continues to go days without a bowel movement and then has to use a Dulcolax suppository for relief.  She never has the urge to have a bowel movement.  Her colonoscopy in 2020 is normal other than diverticular disease.  She has a history of prolapse repair, we have discussed she likely has a component of pelvic floor dysfunction.  I am going to refer her to pelvic floor therapy for further evaluation.  She is also had increased reflux with heartburn.  She has dysphagia to solids.  She is on Nexium 20 mg daily.  Her last EGD was in 2015 with reflux and gastritis.  She agrees to repeat EGD with dilation.  She is concerned about small intestinal bacterial overgrowth, we will biopsy her small bowel at the time of her procedure.  I suspect that her symptoms are multifactorial however ultimately her bloating is likely related to her constipation.  She has not lost any weight.  We will follow-up pending her endoscopic results. MB 1/30/2023 Ms. Lam returns to clinic for follow-up of reflux, bloating, constipation and slow transit history of diverticulitis.  She previously underwent upper endoscopy earlier this month which showed nonsevere esophagitis with nonconcerning pathology.  2 cm hiatal hernia, nonobstructing Schatzki ring dilated with an 18-19-20 mm balloon dilator.  Diffuse mild inflammation was found in the gastric antrum.  Large nonbleeding diverticulum was found in the second portion of the duodenum. Continues on Nexium 40 mg daily, had an episode 2 weeks ago with really bad reflux stating the pain was so bad it was burning into her back and gums giving her ulcers in her mouth.  This dissipated on its own.  She does endorse that she could have had some type of viral illness.  She continues on Motegrity 2 mg daily with MiraLAX and no Colace.  She states at this point she has no urge to defecate and will be presenting for evaluation with pelvic floor therapy in March.  She started new probiotic that her daughter-in-law recommended which is herbal from [employer] and it's helping. She is seeing vascular soon due to her significant family history.  Has not tried FD guard but is willing to start.  SP 4/12/2023 Carole presents for follow-up of abdominal pain and constipation.  Since her last visit she continues to struggle with epigastric abdominal pain that begins at 5 PM every day and last until 7 or 8 or until she gets to bed.  Having a bowel movement does seem to improve the symptoms.  She has a bowel movement most days on the Motegrity but reports incomplete evacuation.  Her EGD in January of this year shows mild gastritis.  Her colonoscopy in 2020 is normal.  Her last contrasted CT scan was in 2019 with acute diverticulitis.  She status post cholecystectomy.  She has tried multiple treatment modalities for the symptoms including Xifaxan and amitriptyline in the past with no relief.  This pain has been going on for years at this point.  Today she is tearful about her symptoms.  She does agree to a trial of BuSpar twice daily for functional dyspepsia.  She feels that the Motegrity is too expensive and does not work well, she would like to give Ibsrela 50 mg twice daily a try, she does agree to consider lactulose if this is no help.  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  She is scheduled to see the pelvic floor therapist this month, her sits marker study does show likely pelvic floor dysfunction.  Today she has multiple GI complaints and is tearful.  MB 7/11/2023 The patient presents today for follow-up of her abdominal pain and constipation.  Since her last visit, she continues to struggle with her bowels.  She was started on is Isael, but she has only been able to take this once a day.  She is concerned if she takes it twice a day, that she will not have any control of her bowels.  She has been attending pelvic floor therapy, but she is unsure if this is helped significantly.  She feels that she does not get evacuation of her bowels.  She does use Dulcolax suppositories, and this does seem to help somewhat.  Jazmyn also started her on BuSpar at her last visit.  She was having significant symptoms of gas and bloating.  She has been started on Ozempic, and we did discuss that this is likely contributing to some of her symptoms.  She does continue to have symptoms of reflux.  Her main issue is her bowels.  She is tearful as to how her bowels control her life.  It is affecting her relationship with her  and her ability to proceed with her day-to-day activities.  Today, we have discussed the goal of trying to have fairly regular bowel movements without accidents.  I do want her to go back on Metamucil.  After discussing all of the medication she has tried in the past, it does seem that likely Linzess has worked the best.  I am going to give her samples of the lowest dose of Linzess.  I am also going to give her samples of the highest dose.  I have explained to her that we need to find out what dose will work the best for her.  She is going to send us a message on the portal as to which one we need to order.  She is going to take Linzess in the morning with Metamucil in the evening.  She will follow-up with Jazmyn in 3 to 4 months for further evaluation. 9/12/2023 Carole presents for follow-up of reflux, constipation, and history of diverticulitis.  Since her last visit she continues to work with pelvic floor therapy.  She is taking the Linzess 290 mcg most days a week however she does experience lack of emptying with a sense of urgency and then explosive diarrhea.  In reviewing all the medication she is taken in the past, Motegrity seem to work the best.  We will repeat a trial of this.  We have discussed MRI defecography and anorectal manometry.  Unfortunately regardless of these test she has failed all medical therapy.  Her pelvic floor therapist has recommended or mentioned biofeedback.  She is going to discuss that further with her pelvic floor therapy team, okay to refer for anorectal manometry if necessary.  Her last colonoscopy was in 2020 with diverticular disease.  Her reflux is stable on as omeprazole 40 mg daily.  Today she continues to struggle with her GI complaints.  JATINDER Edmondsita presents for follow-up of colonoscopy, history of recurrent diverticulitis, history of constipation with pelvic floor dysfunction, and reflux.  Since her last visit she had a violent episode of reflux and regurgitation after eating at [employer].  She is on as omeprazole 40 in the morning and famotidine at night.  She agrees her diet could be better.  She agrees to try Dexilant 60 mg daily and lansoprazole 30 mg twice daily if not covered.  She continues to struggle with constipation but is overall doing well on Motegrity 2 mg in the morning, MiraLAX in the morning, 5 capsules of psyllium in the evening, and 1 Senokot.  She uses a glycerin or Dulcolax suppository every 2 to 3 days with no bowel movement.  She had another episode of diverticulitis with questionable mass effect in the sigmoid.  Her colonoscopy in March shows no colonic mass but diverticular disease.  Today we have discussed her diverticulitis recurrence, she has had sigmoid disease in 2017, 2019, 2020, 2023, and this year.  She feels like she never gets over the episodes.  She is still working with pelvic floor therapy.  She still struggles with evacuation.  Today she agrees to pursue anorectal manometry, to see if she is a candidate for biofeedback.  She is using a squatty potty.  She does agree to a surgical consultation with Dr. Ochoa for her pelvic floor dysfunction and also recurrent diverticulitis.  We will increase her PPI as well.  MB 6/3/2024 Carole returns to clinic for follow-up after anal manometry testing ordered by Jazmyn.  She has not heard from [facility] regarding scheduling her biofeedback quite yet. She did see Dr. Ramos and has no immediate plans for surgery. Today she would like to discuss advancing her treatment for constipation.  He continues on her previous combination of Motegrity, Colace, MiraLAX, fiber.  She has failed Linzess in the past but she states this was due to severe explosive diarrhea which lasted a few days.  She did not continue on the medication past the purge..  She is willing to restart and see if she can tolerate to determine what her baseline on this medication would be. Today we discussed starting Linzess 72 mcg and she will return in 4 months hopefully will have completed biofeedback at that time.  If she has recurring diverticulitis episode she is to contact Dr. Ramos. She has no other GI complaints today, GERD symptoms well-controlled. SP  10/1/2024 Carole returns to clinic for follow up of constipation. She recently suffered from shingles with rash and pain right lower back, onset was 4 months ago and she continues gabapentin. Due to this ongoing pain she  has put off trying Linzess and any pursuit for  elective surgical resection with Dr. Ochoa. Feels she was doing welll enough with daily BM and soft stool. It does seem "squeezed" and smaller caliber. She will let us know if this continues. Denies blood in stool.  Feels she has return of  narrowing of esophagus, pills sluggish feeling returned on transit, nothing is getting stuck. Since starting gabapentin, she has not experienced any epigastric discomfort and does not feel she needs to chew gum through the day for relief.  SWATHI  4/2/2025 Patient returns to clinic for follow-up of constipation, GERD.  Since her last office visit she is on the combination of Motegrity MiraLAX fiber has worked well for her constipation. She continues with post shingles neuralgia pain. She uses an occasional suppository.  She has had a recent event where she feels she had reflux into her mouth.  She feels she has had trouble swallowing the capsules, notably with larger ones.  Occasionally with some food bolus.  She continues lansoprazole twice a day 30 mg.  She would like to repeat upper endoscopy and possible dilation.  We will schedule that today. She denies any other GI concerns. SWATHI

## 2025-04-09 ENCOUNTER — LAB OUTSIDE AN ENCOUNTER (OUTPATIENT)
Dept: URBAN - NONMETROPOLITAN AREA CLINIC 2 | Facility: CLINIC | Age: 75
End: 2025-04-09

## 2025-04-09 ENCOUNTER — TELEPHONE ENCOUNTER (OUTPATIENT)
Dept: URBAN - NONMETROPOLITAN AREA CLINIC 2 | Facility: CLINIC | Age: 75
End: 2025-04-09

## 2025-04-25 ENCOUNTER — WEB ENCOUNTER (OUTPATIENT)
Dept: URBAN - NONMETROPOLITAN AREA CLINIC 13 | Facility: CLINIC | Age: 75
End: 2025-04-25

## 2025-05-19 ENCOUNTER — ERX REFILL RESPONSE (OUTPATIENT)
Dept: URBAN - NONMETROPOLITAN AREA CLINIC 2 | Facility: CLINIC | Age: 75
End: 2025-05-19

## 2025-05-19 RX ORDER — LANSOPRAZOLE 30 MG/1
1 CAPSULE BEFORE A MEAL CAPSULE, DELAYED RELEASE ORAL TWICE DAILY
Qty: 180 CAPSULE | Refills: 3

## 2025-05-21 ENCOUNTER — WEB ENCOUNTER (OUTPATIENT)
Dept: URBAN - NONMETROPOLITAN AREA CLINIC 2 | Facility: CLINIC | Age: 75
End: 2025-05-21

## 2025-05-21 RX ORDER — LANSOPRAZOLE 30 MG/1
1 CAPSULE BEFORE A MEAL CAPSULE, DELAYED RELEASE ORAL TWICE DAILY
Qty: 180 CAPSULE | Refills: 3

## 2025-07-09 ENCOUNTER — WEB ENCOUNTER (OUTPATIENT)
Dept: URBAN - NONMETROPOLITAN AREA CLINIC 2 | Facility: CLINIC | Age: 75
End: 2025-07-09

## 2025-07-09 RX ORDER — HYOSCYAMINE SULFATE 100 MG/1
1 TABLET UNDER THE TONGUE AND ALLOW TO DISSOLVE AS NEEDED TABLET ORAL EVERY 4 HOURS
Qty: 180 TABLET | Refills: 11 | OUTPATIENT
Start: 2025-07-09

## 2025-07-10 ENCOUNTER — ERX REFILL RESPONSE (OUTPATIENT)
Dept: URBAN - NONMETROPOLITAN AREA CLINIC 2 | Facility: CLINIC | Age: 75
End: 2025-07-10

## 2025-07-10 RX ORDER — HYOSCYAMINE SULFATE 0.12 MG/1
DISSOLVE ONE TABLET UNDER THE TONGUE THREE TIMES A DAY AS NEEDED TABLET SUBLINGUAL
Qty: 90 TABLET | Refills: 5 | OUTPATIENT

## 2025-07-21 ENCOUNTER — WEB ENCOUNTER (OUTPATIENT)
Dept: URBAN - NONMETROPOLITAN AREA CLINIC 2 | Facility: CLINIC | Age: 75
End: 2025-07-21

## 2025-07-25 ENCOUNTER — TELEPHONE ENCOUNTER (OUTPATIENT)
Dept: URBAN - NONMETROPOLITAN AREA CLINIC 2 | Facility: CLINIC | Age: 75
End: 2025-07-25

## 2025-08-04 ENCOUNTER — WEB ENCOUNTER (OUTPATIENT)
Dept: URBAN - NONMETROPOLITAN AREA CLINIC 2 | Facility: CLINIC | Age: 75
End: 2025-08-04

## 2025-08-04 ENCOUNTER — TELEPHONE ENCOUNTER (OUTPATIENT)
Dept: URBAN - NONMETROPOLITAN AREA CLINIC 2 | Facility: CLINIC | Age: 75
End: 2025-08-04

## 2025-08-07 ENCOUNTER — OFFICE VISIT (OUTPATIENT)
Dept: URBAN - METROPOLITAN AREA MEDICAL CENTER 1 | Facility: MEDICAL CENTER | Age: 75
End: 2025-08-07
Payer: MEDICARE

## 2025-08-07 ENCOUNTER — LAB OUTSIDE AN ENCOUNTER (OUTPATIENT)
Dept: URBAN - NONMETROPOLITAN AREA CLINIC 2 | Facility: CLINIC | Age: 75
End: 2025-08-07

## 2025-08-07 DIAGNOSIS — K22.89 OTHER SPECIFIED DISEASE OF ESOPHAGUS: ICD-10-CM

## 2025-08-07 DIAGNOSIS — K29.60 OTHER GASTRITIS WITHOUT BLEEDING: ICD-10-CM

## 2025-08-07 DIAGNOSIS — K22.2 ACQUIRED ESOPHAGEAL RING: ICD-10-CM

## 2025-08-07 PROCEDURE — 43249 ESOPH EGD DILATION <30 MM: CPT | Performed by: INTERNAL MEDICINE

## 2025-08-07 PROCEDURE — 43239 EGD BIOPSY SINGLE/MULTIPLE: CPT | Performed by: INTERNAL MEDICINE

## 2025-08-07 RX ORDER — VALSARTAN 160 MG/1
1 TABLET TABLET, FILM COATED ORAL ONCE A DAY
Status: ACTIVE | COMMUNITY

## 2025-08-07 RX ORDER — GABAPENTIN 300 MG/1
CAPSULE ORAL
Qty: 75 CAPSULE | Status: ACTIVE | COMMUNITY

## 2025-08-07 RX ORDER — METOPROLOL TARTRATE 25 MG/1
TAKE ONE TABLET BY MOUTH TWICE A DAY TABLET, FILM COATED ORAL
Qty: 60 UNSPECIFIED | Refills: 1 | Status: ACTIVE | COMMUNITY

## 2025-08-07 RX ORDER — LEVOTHYROXINE SODIUM 75 UG/1
1 TABLET IN THE MORNING ON AN EMPTY STOMACH TABLET ORAL ONCE A DAY
Status: ACTIVE | COMMUNITY

## 2025-08-07 RX ORDER — AMLODIPINE BESYLATE 10 MG/1
TAKE ONE TABLET BY MOUTH ONE TIME DAILY TABLET ORAL
Qty: 90 UNSPECIFIED | Refills: 3 | Status: ACTIVE | COMMUNITY

## 2025-08-07 RX ORDER — BUPROPION HYDROCHLORIDE 150 MG/1
1 TABLET IN THE MORNING TABLET, EXTENDED RELEASE ORAL ONCE A DAY
Status: ACTIVE | COMMUNITY

## 2025-08-07 RX ORDER — LANSOPRAZOLE 30 MG/1
1 CAPSULE BEFORE A MEAL CAPSULE, DELAYED RELEASE ORAL TWICE DAILY
Qty: 180 CAPSULE | Refills: 3 | Status: ACTIVE | COMMUNITY

## 2025-08-07 RX ORDER — BUSPIRONE HYDROCHLORIDE 10 MG/1
1 TABLET TABLET ORAL TWICE A DAY
Qty: 180 TABLET | Refills: 3 | Status: ACTIVE | COMMUNITY
Start: 2023-04-12

## 2025-08-07 RX ORDER — PRUCALOPRIDE 2 MG/1
TAKE ONE TABLET BY MOUTH ONE TIME DAILY TABLET, FILM COATED ORAL ONCE A DAY
Qty: 90 TABLET | Refills: 3 | Status: ACTIVE | COMMUNITY

## 2025-08-07 RX ORDER — NITROFURANTOIN MACROCRYSTALS 50 MG/1
TAKE ONE CAPSULE BY MOUTH ONE TIME DAILY CAPSULE ORAL
Qty: 90 UNSPECIFIED | Refills: 3 | Status: ACTIVE | COMMUNITY

## 2025-08-07 RX ORDER — SEMAGLUTIDE 1.34 MG/ML
AS DIRECTED INJECTION, SOLUTION SUBCUTANEOUS
Status: ACTIVE | COMMUNITY

## 2025-08-07 RX ORDER — ZINC GLUCONATE 50 MG
1 TABLET TABLET ORAL ONCE A DAY
Status: ACTIVE | COMMUNITY

## 2025-08-07 RX ORDER — ROSUVASTATIN 20 MG/1
1 TABLET TABLET, FILM COATED ORAL ONCE A DAY
Status: ACTIVE | COMMUNITY

## 2025-08-07 RX ORDER — HYOSCYAMINE SULFATE 0.12 MG/1
DISSOLVE ONE TABLET UNDER THE TONGUE THREE TIMES A DAY AS NEEDED TABLET SUBLINGUAL
Qty: 90 TABLET | Refills: 5 | Status: ACTIVE | COMMUNITY

## 2025-08-07 RX ORDER — HYOSCYAMINE SULFATE 100 MG/1
1 TABLET UNDER THE TONGUE AND ALLOW TO DISSOLVE AS NEEDED TABLET ORAL EVERY 4 HOURS
Qty: 180 TABLET | Refills: 11 | Status: ACTIVE | COMMUNITY
Start: 2025-07-09

## 2025-08-07 RX ORDER — CYCLOBENZAPRINE HYDROCHLORIDE 10 MG/1
1 TABLET AT BEDTIME AS NEEDED TABLET, FILM COATED ORAL ONCE A DAY
Status: ACTIVE | COMMUNITY

## 2025-08-07 RX ORDER — ZOLPIDEM TARTRATE 10 MG/1
1 TABLET AT BEDTIME AS NEEDED TABLET, FILM COATED ORAL ONCE A DAY
Status: ACTIVE | COMMUNITY

## 2025-08-07 RX ORDER — LINACLOTIDE 72 UG/1
1 CAPSULE AT LEAST 30 MINUTES BEFORE THE FIRST MEAL OF THE DAY ON AN EMPTY STOMACH CAPSULE, GELATIN COATED ORAL ONCE A DAY
Qty: 90 | Refills: 3 | Status: ACTIVE | COMMUNITY

## 2025-08-28 ENCOUNTER — TELEPHONE ENCOUNTER (OUTPATIENT)
Dept: URBAN - NONMETROPOLITAN AREA CLINIC 2 | Facility: CLINIC | Age: 75
End: 2025-08-28